# Patient Record
Sex: MALE | Race: WHITE | NOT HISPANIC OR LATINO | URBAN - METROPOLITAN AREA
[De-identification: names, ages, dates, MRNs, and addresses within clinical notes are randomized per-mention and may not be internally consistent; named-entity substitution may affect disease eponyms.]

---

## 2017-04-28 ENCOUNTER — OUTPATIENT (OUTPATIENT)
Dept: OUTPATIENT SERVICES | Facility: HOSPITAL | Age: 57
LOS: 1 days | Discharge: HOME | End: 2017-04-28
Payer: COMMERCIAL

## 2017-04-28 DIAGNOSIS — E78.5 HYPERLIPIDEMIA, UNSPECIFIED: ICD-10-CM

## 2017-04-28 PROCEDURE — 93010 ELECTROCARDIOGRAM REPORT: CPT

## 2023-02-08 ENCOUNTER — EMERGENCY (EMERGENCY)
Facility: HOSPITAL | Age: 63
LOS: 0 days | Discharge: HOME | End: 2023-02-08
Attending: EMERGENCY MEDICINE
Payer: MEDICARE

## 2023-02-08 VITALS
TEMPERATURE: 99 F | DIASTOLIC BLOOD PRESSURE: 89 MMHG | SYSTOLIC BLOOD PRESSURE: 139 MMHG | HEART RATE: 90 BPM | RESPIRATION RATE: 17 BRPM | OXYGEN SATURATION: 98 %

## 2023-02-08 VITALS — WEIGHT: 158.07 LBS | HEIGHT: 71 IN

## 2023-02-08 DIAGNOSIS — R19.09 OTHER INTRA-ABDOMINAL AND PELVIC SWELLING, MASS AND LUMP: ICD-10-CM

## 2023-02-08 DIAGNOSIS — K21.9 GASTRO-ESOPHAGEAL REFLUX DISEASE WITHOUT ESOPHAGITIS: ICD-10-CM

## 2023-02-08 DIAGNOSIS — E78.5 HYPERLIPIDEMIA, UNSPECIFIED: ICD-10-CM

## 2023-02-08 DIAGNOSIS — I10 ESSENTIAL (PRIMARY) HYPERTENSION: ICD-10-CM

## 2023-02-08 DIAGNOSIS — K40.90 UNILATERAL INGUINAL HERNIA, WITHOUT OBSTRUCTION OR GANGRENE, NOT SPECIFIED AS RECURRENT: ICD-10-CM

## 2023-02-08 PROCEDURE — 99283 EMERGENCY DEPT VISIT LOW MDM: CPT

## 2023-02-08 PROCEDURE — 99282 EMERGENCY DEPT VISIT SF MDM: CPT

## 2023-02-08 NOTE — ED STATDOCS - PROGRESS NOTE DETAILS
61 y/o M with PMH of GERD, HLD, HTN presetns with swelling to right inguinal area x 3 days. Swelling worse with standing. Denies fever, chills, nausae, vomitign, difficulty urinating, constipation. +adequate appetite. PE: Well appearing. Cardiac: s1s2, RRR. lungs: CTAB. Abdomen: NBS x4, soft, nontender. +reducible hernia right inguinal area. : performed with Dr. Saha. Normal testicular lie. No testicular tenderness. A/P: inguinal hernia. Advised patient to follow up with surgery for elective repair, return precautions provided. - Michel Nick PA-C

## 2023-02-08 NOTE — ED ADULT TRIAGE NOTE - ARRIVAL FROM
Problem: METABOLIC, FLUID AND ELECTROLYTES - ADULT  Goal: Electrolytes maintained within normal limits  Description  INTERVENTIONS:  - Monitor labs and assess patient for signs and symptoms of electrolyte imbalances  - Administer electrolyte replacement as ordered  - Monitor response to electrolyte replacements, including repeat lab results as appropriate  - Instruct patient on fluid and nutrition as appropriate  Outcome: Progressing  Goal: Fluid balance maintained  Description  INTERVENTIONS:  - Monitor labs   - Monitor I/O and WT  - Instruct patient on fluid and nutrition as appropriate  - Assess for signs & symptoms of volume excess or deficit  Outcome: Progressing  Goal: Glucose maintained within target range  Description  INTERVENTIONS:  - Monitor Blood Glucose as ordered  - Assess for signs and symptoms of hyperglycemia and hypoglycemia  - Administer ordered medications to maintain glucose within target range  - Assess nutritional intake and initiate nutrition service referral as needed  Outcome: Progressing Home

## 2023-02-08 NOTE — ED ADULT NURSE NOTE - NSIMPLEMENTINTERV_GEN_ALL_ED
Implemented All Fall Risk Interventions:  [place] to call system. Call bell, personal items and telephone within reach. Instruct patient to call for assistance. Room bathroom lighting operational. Non-slip footwear when patient is off stretcher. Physically safe environment: no spills, clutter or unnecessary equipment. Stretcher in lowest position, wheels locked, appropriate side rails in place. Provide visual cue, wrist band, yellow gown, etc. Monitor gait and stability. Monitor for mental status changes and reorient to person, place, and time. Review medications for side effects contributing to fall risk. Reinforce activity limits and safety measures with patient and family.

## 2023-02-08 NOTE — ED STATDOCS - NS_ ATTENDINGSCRIBEDETAILS _ED_A_ED_FT
I, Dimitri Saha MD,  performed the initial face to face bedside interview with this patient regarding history of present illness, review of symptoms and relevant past medical, social and family history.  I completed an independent physical examination.  I was the initial provider who evaluated this patient.   I personally saw the patient and performed a substantive portion of the visit including all aspects of the medical decision making.  The history, relevant review of systems, past medical and surgical history, medical decision making, and physical examination was documented by the scribe in my presence and I attest to the accuracy of the documentation.

## 2023-02-08 NOTE — ED STATDOCS - PATIENT PORTAL LINK FT
You can access the FollowMyHealth Patient Portal offered by [facility] by registering at the following website: [email or web address]. By joining [employer]’s FollowMyHealth portal, you will also be able to view your health information using other applications (apps) compatible with our system.

## 2023-02-08 NOTE — ED STATDOCS - CLINICAL SUMMARY MEDICAL DECISION MAKING FREE TEXT BOX
61 y/o male presents with right inguinal bulging after coughing. Exam with obvious hernia that is reducible. No signs of strangulation, incarceration, SBO. Will have pt to follow up with surgery. 61 y/o male presents with right inguinal bulging after coughing. Exam with obvious hernia that is reducible. No signs of strangulation, incarceration, SBO. no testicular pain. Will have pt to follow up with surgery.

## 2023-02-08 NOTE — ED ADULT TRIAGE NOTE - CHIEF COMPLAINT QUOTE
lump on lower abdomen since Saturday. pt denies drainage, states "it looks like it's under the skin". denies erythema or warmth to area but c/o tenderness. pt has not checked his temperature but does not suspect being febrile. no s/sx of systemic infection- denies chills.

## 2023-02-08 NOTE — ED STATDOCS - OBJECTIVE STATEMENT
61 y/o male with a PMHx of GERD, HLD, HTN presents to the ED for evaluation. Pt reports 3 days ago he was showering and felt a lump on the right side of his abd. Pt reports he coughed prior to noticing bulge. Denies testicular pain, fevers. Nonsmoker. No EtOH use. No other complaints at this time.

## 2023-02-08 NOTE — ED STATDOCS - PHYSICAL EXAMINATION
Constitutional: NAD AAOx3  Eyes: PERRLA EOMI  Head: Normocephalic atraumatic  Mouth: MMM  Cardiac: regular rate   Resp: Lungs CTAB  GI: Abd s/nt/nd. Right inguinal hernia reducible. No redness or tenderness. No lymphadenopathy. Normal gross testicular exam.   Neuro: CN2-12 intact  Skin: No visible rashes

## 2023-02-08 NOTE — ED STATDOCS - NS ED ROS FT
Constitutional: No fever or chills  Eyes: No visual changes  HEENT: No throat pain  CV: No chest pain  Resp: No SOB no cough  GI: No abd pain, nausea or vomiting. +lump to abd  : No dysuria  MSK: No musculoskeletal pain  Skin: No rash  Neuro: No headache

## 2023-02-08 NOTE — ED STATDOCS - NS ED ATTENDING STATEMENT MOD
This was a shared visit with the PHILLY. I reviewed and verified the documentation and independently performed the documented:

## 2023-02-08 NOTE — ED STATDOCS - CARE PROVIDER_API CALL
Galileo Phillips  SURGERY  [address] [place] [address]  Phone: [phone]  Fax: [phone]  Follow Up Time:

## 2023-02-08 NOTE — ED STATDOCS - ATTENDING APP SHARED VISIT CONTRIBUTION OF CARE
I, Dimitri Saha MD, personally saw the patient with ACP.  I have personally performed a face to face diagnostic evaluation on this patient.  I have reviewed the ACP note and agree with the history, exam, and plan of care, except as noted.   The initial assessment was performed by myself and then the patient was handed off to the ACP. The patient was followed and re-evaluated by the ACP. All labs, imaging and procedures were evaluated and performed by the ACP and I was available for consultation if any questions in the patients care came up.

## 2023-05-08 PROBLEM — K21.9 GASTRO-ESOPHAGEAL REFLUX DISEASE WITHOUT ESOPHAGITIS: Chronic | Status: ACTIVE | Noted: 2023-02-08

## 2023-05-08 PROBLEM — I10 ESSENTIAL (PRIMARY) HYPERTENSION: Chronic | Status: ACTIVE | Noted: 2023-02-08

## 2023-05-08 PROBLEM — E78.5 HYPERLIPIDEMIA, UNSPECIFIED: Chronic | Status: ACTIVE | Noted: 2023-02-08

## 2023-05-10 ENCOUNTER — OUTPATIENT (OUTPATIENT)
Dept: OUTPATIENT SERVICES | Facility: HOSPITAL | Age: 63
LOS: 1 days | End: 2023-05-10
Payer: MEDICARE

## 2023-05-10 DIAGNOSIS — S82.92XA UNSPECIFIED FRACTURE OF LEFT LOWER LEG, INITIAL ENCOUNTER FOR CLOSED FRACTURE: Chronic | ICD-10-CM

## 2023-05-10 DIAGNOSIS — Z98.890 OTHER SPECIFIED POSTPROCEDURAL STATES: Chronic | ICD-10-CM

## 2023-05-10 DIAGNOSIS — K40.90 UNILATERAL INGUINAL HERNIA, WITHOUT OBSTRUCTION OR GANGRENE, NOT SPECIFIED AS RECURRENT: ICD-10-CM

## 2023-05-10 DIAGNOSIS — Z01.818 ENCOUNTER FOR OTHER PREPROCEDURAL EXAMINATION: ICD-10-CM

## 2023-05-10 LAB
ANION GAP SERPL CALC-SCNC: 4 MMOL/L — LOW (ref 5–17)
BASOPHILS # BLD AUTO: 0.03 K/UL — SIGNIFICANT CHANGE UP (ref 0–0.2)
BASOPHILS NFR BLD AUTO: 0.6 % — SIGNIFICANT CHANGE UP (ref 0–2)
BUN SERPL-MCNC: 22 MG/DL — SIGNIFICANT CHANGE UP (ref 7–23)
CALCIUM SERPL-MCNC: 8.7 MG/DL — SIGNIFICANT CHANGE UP (ref 8.5–10.1)
CHLORIDE SERPL-SCNC: 110 MMOL/L — HIGH (ref 96–108)
CO2 SERPL-SCNC: 28 MMOL/L — SIGNIFICANT CHANGE UP (ref 22–31)
CREAT SERPL-MCNC: 0.86 MG/DL — SIGNIFICANT CHANGE UP (ref 0.5–1.3)
EGFR: 97 ML/MIN/1.73M2 — SIGNIFICANT CHANGE UP
EOSINOPHIL # BLD AUTO: 0.07 K/UL — SIGNIFICANT CHANGE UP (ref 0–0.5)
EOSINOPHIL NFR BLD AUTO: 1.5 % — SIGNIFICANT CHANGE UP (ref 0–6)
GLUCOSE SERPL-MCNC: 82 MG/DL — SIGNIFICANT CHANGE UP (ref 70–99)
HCT VFR BLD CALC: 40.2 % — SIGNIFICANT CHANGE UP (ref 39–50)
HGB BLD-MCNC: 13.7 G/DL — SIGNIFICANT CHANGE UP (ref 13–17)
IMM GRANULOCYTES NFR BLD AUTO: 0.2 % — SIGNIFICANT CHANGE UP (ref 0–0.9)
LYMPHOCYTES # BLD AUTO: 1.97 K/UL — SIGNIFICANT CHANGE UP (ref 1–3.3)
LYMPHOCYTES # BLD AUTO: 41.6 % — SIGNIFICANT CHANGE UP (ref 13–44)
MCHC RBC-ENTMCNC: 31.9 PG — SIGNIFICANT CHANGE UP (ref 27–34)
MCHC RBC-ENTMCNC: 34.1 GM/DL — SIGNIFICANT CHANGE UP (ref 32–36)
MCV RBC AUTO: 93.5 FL — SIGNIFICANT CHANGE UP (ref 80–100)
MONOCYTES # BLD AUTO: 0.42 K/UL — SIGNIFICANT CHANGE UP (ref 0–0.9)
MONOCYTES NFR BLD AUTO: 8.9 % — SIGNIFICANT CHANGE UP (ref 2–14)
NEUTROPHILS # BLD AUTO: 2.24 K/UL — SIGNIFICANT CHANGE UP (ref 1.8–7.4)
NEUTROPHILS NFR BLD AUTO: 47.2 % — SIGNIFICANT CHANGE UP (ref 43–77)
PLATELET # BLD AUTO: 193 K/UL — SIGNIFICANT CHANGE UP (ref 150–400)
POTASSIUM SERPL-MCNC: 3.7 MMOL/L — SIGNIFICANT CHANGE UP (ref 3.5–5.3)
POTASSIUM SERPL-SCNC: 3.7 MMOL/L — SIGNIFICANT CHANGE UP (ref 3.5–5.3)
RBC # BLD: 4.3 M/UL — SIGNIFICANT CHANGE UP (ref 4.2–5.8)
RBC # FLD: 13.1 % — SIGNIFICANT CHANGE UP (ref 10.3–14.5)
SODIUM SERPL-SCNC: 142 MMOL/L — SIGNIFICANT CHANGE UP (ref 135–145)
WBC # BLD: 4.74 K/UL — SIGNIFICANT CHANGE UP (ref 3.8–10.5)
WBC # FLD AUTO: 4.74 K/UL — SIGNIFICANT CHANGE UP (ref 3.8–10.5)

## 2023-05-10 PROCEDURE — 87640 STAPH A DNA AMP PROBE: CPT

## 2023-05-10 PROCEDURE — 86850 RBC ANTIBODY SCREEN: CPT

## 2023-05-10 PROCEDURE — 85025 COMPLETE CBC W/AUTO DIFF WBC: CPT

## 2023-05-10 PROCEDURE — 93010 ELECTROCARDIOGRAM REPORT: CPT

## 2023-05-10 PROCEDURE — 86900 BLOOD TYPING SEROLOGIC ABO: CPT

## 2023-05-10 PROCEDURE — 80048 BASIC METABOLIC PNL TOTAL CA: CPT

## 2023-05-10 PROCEDURE — 36415 COLL VENOUS BLD VENIPUNCTURE: CPT

## 2023-05-10 PROCEDURE — 87641 MR-STAPH DNA AMP PROBE: CPT

## 2023-05-10 PROCEDURE — 93005 ELECTROCARDIOGRAM TRACING: CPT

## 2023-05-10 PROCEDURE — 86901 BLOOD TYPING SEROLOGIC RH(D): CPT

## 2023-05-10 RX ORDER — FUROSEMIDE 40 MG
0 TABLET ORAL
Qty: 0 | Refills: 0 | DISCHARGE

## 2023-05-10 RX ORDER — SIMVASTATIN 20 MG/1
0 TABLET, FILM COATED ORAL
Qty: 0 | Refills: 0 | DISCHARGE

## 2023-05-10 RX ORDER — HYDROCHLOROTHIAZIDE 25 MG
0 TABLET ORAL
Qty: 0 | Refills: 0 | DISCHARGE

## 2023-05-10 NOTE — ASU PATIENT PROFILE, ADULT - NSICDXPASTSURGICALHX_GEN_ALL_CORE_FT
PAST SURGICAL HISTORY:  Fracture of left leg     H/O left wrist surgery     History of meniscectomy of left knee

## 2023-05-10 NOTE — CHART NOTE - NSCHARTNOTEFT_GEN_A_CORE
63 y/o male presents to PST for scheduled right inguinal hernia on 5/16/23.      vs-118/60 hr 66 rr 14 temp 97.6, o2 sat 100%    cbc, bmp, type and screen, mrsa, done today in PST     Pre op, mupirocin and chlorhexidine instructions given and explained.  educational booklet provided   Avoid NSAIDs and OTC supplements.   Patient verbalized understanding  no medication on the DOS

## 2023-05-10 NOTE — ASU PATIENT PROFILE, ADULT - NSICDXPASTMEDICALHX_GEN_ALL_CORE_FT
PAST MEDICAL HISTORY:  Anxiety     GERD (gastroesophageal reflux disease)     HLD (hyperlipidemia)     HTN (hypertension)

## 2023-05-11 DIAGNOSIS — K40.90 UNILATERAL INGUINAL HERNIA, WITHOUT OBSTRUCTION OR GANGRENE, NOT SPECIFIED AS RECURRENT: ICD-10-CM

## 2023-05-11 DIAGNOSIS — Z01.818 ENCOUNTER FOR OTHER PREPROCEDURAL EXAMINATION: ICD-10-CM

## 2023-05-11 LAB
MRSA PCR RESULT.: SIGNIFICANT CHANGE UP
S AUREUS DNA NOSE QL NAA+PROBE: SIGNIFICANT CHANGE UP

## 2023-05-16 ENCOUNTER — TRANSCRIPTION ENCOUNTER (OUTPATIENT)
Age: 63
End: 2023-05-16

## 2023-05-16 ENCOUNTER — OUTPATIENT (OUTPATIENT)
Dept: INPATIENT UNIT | Facility: HOSPITAL | Age: 63
LOS: 1 days | Discharge: HOME | End: 2023-05-16
Payer: MEDICARE

## 2023-05-16 VITALS
DIASTOLIC BLOOD PRESSURE: 65 MMHG | HEART RATE: 73 BPM | TEMPERATURE: 98 F | RESPIRATION RATE: 16 BRPM | SYSTOLIC BLOOD PRESSURE: 112 MMHG | OXYGEN SATURATION: 96 %

## 2023-05-16 VITALS
SYSTOLIC BLOOD PRESSURE: 112 MMHG | OXYGEN SATURATION: 99 % | RESPIRATION RATE: 15 BRPM | WEIGHT: 171.96 LBS | TEMPERATURE: 98 F | DIASTOLIC BLOOD PRESSURE: 58 MMHG | HEART RATE: 58 BPM | HEIGHT: 71 IN

## 2023-05-16 DIAGNOSIS — Z98.890 OTHER SPECIFIED POSTPROCEDURAL STATES: Chronic | ICD-10-CM

## 2023-05-16 DIAGNOSIS — K40.90 UNILATERAL INGUINAL HERNIA, WITHOUT OBSTRUCTION OR GANGRENE, NOT SPECIFIED AS RECURRENT: ICD-10-CM

## 2023-05-16 DIAGNOSIS — S82.92XA UNSPECIFIED FRACTURE OF LEFT LOWER LEG, INITIAL ENCOUNTER FOR CLOSED FRACTURE: Chronic | ICD-10-CM

## 2023-05-16 PROCEDURE — C1781: CPT

## 2023-05-16 PROCEDURE — 49505 PRP I/HERN INIT REDUC >5 YR: CPT | Mod: AS,RT

## 2023-05-16 PROCEDURE — 88302 TISSUE EXAM BY PATHOLOGIST: CPT

## 2023-05-16 PROCEDURE — 88302 TISSUE EXAM BY PATHOLOGIST: CPT | Mod: 26

## 2023-05-16 RX ORDER — OXYCODONE HYDROCHLORIDE 5 MG/1
10 TABLET ORAL ONCE
Refills: 0 | Status: DISCONTINUED | OUTPATIENT
Start: 2023-05-16 | End: 2023-05-16

## 2023-05-16 RX ORDER — OXYCODONE AND ACETAMINOPHEN 5; 325 MG/1; MG/1
1 TABLET ORAL
Qty: 18 | Refills: 0
Start: 2023-05-16 | End: 2023-05-18

## 2023-05-16 RX ORDER — SODIUM CHLORIDE 9 MG/ML
1000 INJECTION, SOLUTION INTRAVENOUS
Refills: 0 | Status: DISCONTINUED | OUTPATIENT
Start: 2023-05-16 | End: 2023-05-16

## 2023-05-16 RX ORDER — FUROSEMIDE 40 MG
1 TABLET ORAL
Refills: 0 | DISCHARGE

## 2023-05-16 RX ORDER — ONDANSETRON 8 MG/1
4 TABLET, FILM COATED ORAL ONCE
Refills: 0 | Status: DISCONTINUED | OUTPATIENT
Start: 2023-05-16 | End: 2023-05-16

## 2023-05-16 RX ORDER — FENTANYL CITRATE 50 UG/ML
50 INJECTION INTRAVENOUS
Refills: 0 | Status: DISCONTINUED | OUTPATIENT
Start: 2023-05-16 | End: 2023-05-16

## 2023-05-16 RX ORDER — OXYCODONE HYDROCHLORIDE 5 MG/1
5 TABLET ORAL ONCE
Refills: 0 | Status: DISCONTINUED | OUTPATIENT
Start: 2023-05-16 | End: 2023-05-16

## 2023-05-16 NOTE — BRIEF OPERATIVE NOTE - OPERATION/FINDINGS
indirect hernia Detail Level: Simple Instructions: This plan will send the code FBSE to the PM system.  DO NOT or CHANGE the price. Price (Do Not Change): 0.00

## 2023-05-16 NOTE — ASU PATIENT PROFILE, ADULT - PAIN SCALE PREFERRED, PROFILE
Problem: Falls - Risk of:  Goal: Will remain free from falls  Description: Will remain free from falls  3/4/2021 1028 by Angel Red RN  Outcome: Ongoing  Note: Patient is a fall risk. See Fall Risk assessment for details. Bed is in low, lock position; call light/belongings within reach. No attempts to get out of bed have been made, calls appropriately when assistance is needed. Bed alarm and hourly rounds in place for safety. Pt would require a slide board to get in and out of bed. Problem: Skin Integrity:  Goal: Will show no infection signs and symptoms  Description: Will show no infection signs and symptoms  3/4/2021 1028 by Angel Red RN  Outcome: Ongoing  Note: Pt at risk for skin breakdown. Skin assessment complete. No signs of skin breakdown. Pts skin cleansed with inc cleanser. Pt repositioned in bed with pillow support.  Pt encouraged to keep AKA elevated on a pillow numerical 0-10

## 2023-05-16 NOTE — ASU PATIENT PROFILE, ADULT - PATIENT REPRESENTATIVE NAME
Attempted to call pt, line busy will try later
Left message for parents to call back regarding results.
PHONE LINE IS BUSY. WILL CALL AGAIN LATER.
Please call and inform parent that we received [facility] results from the throat culture done on Monday. It was negative. Please instruct to continue symptomatic treatment as discussed with [place] in office. If patient is not improving or developing any new/worsening symptoms then RTC, prn or follow up with PCP.  Thanks
brother riki

## 2023-05-16 NOTE — ASU PATIENT PROFILE, ADULT - FALL HARM RISK - UNIVERSAL INTERVENTIONS
Bed in lowest position, wheels locked, appropriate side rails in place/Call bell, personal items and telephone in reach/Instruct patient to call for assistance before getting out of bed or chair/Non-slip footwear when patient is out of bed/[place] to call system/Physically safe environment - no spills, clutter or unnecessary equipment/Purposeful Proactive Rounding/Room/bathroom lighting operational, light cord in reach

## 2023-05-16 NOTE — BRIEF OPERATIVE NOTE - NSICDXBRIEFPROCEDURE_GEN_ALL_CORE_FT
PROCEDURES:  Repair, hernia, inguinal, open, using mesh, adult 16-May-2023 17:44:59  Galileo Phillips

## 2023-05-19 DIAGNOSIS — F41.9 ANXIETY DISORDER, UNSPECIFIED: ICD-10-CM

## 2023-05-19 DIAGNOSIS — K21.9 GASTRO-ESOPHAGEAL REFLUX DISEASE WITHOUT ESOPHAGITIS: ICD-10-CM

## 2023-05-19 DIAGNOSIS — K40.90 UNILATERAL INGUINAL HERNIA, WITHOUT OBSTRUCTION OR GANGRENE, NOT SPECIFIED AS RECURRENT: ICD-10-CM

## 2023-05-19 DIAGNOSIS — Z87.891 PERSONAL HISTORY OF NICOTINE DEPENDENCE: ICD-10-CM

## 2023-05-19 DIAGNOSIS — E78.5 HYPERLIPIDEMIA, UNSPECIFIED: ICD-10-CM

## 2023-05-19 DIAGNOSIS — I10 ESSENTIAL (PRIMARY) HYPERTENSION: ICD-10-CM

## 2023-05-19 LAB — SURGICAL PATHOLOGY STUDY: SIGNIFICANT CHANGE UP

## 2023-10-13 PROBLEM — F41.9 ANXIETY DISORDER, UNSPECIFIED: Chronic | Status: ACTIVE | Noted: 2023-05-10

## 2023-11-22 ENCOUNTER — APPOINTMENT (OUTPATIENT)
Dept: ORTHOPEDIC SURGERY | Facility: CLINIC | Age: 63
End: 2023-11-22
Payer: MEDICARE

## 2023-11-22 VITALS — HEIGHT: 71 IN | WEIGHT: 180 LBS | BODY MASS INDEX: 25.2 KG/M2

## 2023-11-22 DIAGNOSIS — Z87.891 PERSONAL HISTORY OF NICOTINE DEPENDENCE: ICD-10-CM

## 2023-11-22 DIAGNOSIS — G89.29 PAIN IN RIGHT HIP: ICD-10-CM

## 2023-11-22 DIAGNOSIS — E78.00 PURE HYPERCHOLESTEROLEMIA, UNSPECIFIED: ICD-10-CM

## 2023-11-22 DIAGNOSIS — Z87.19 PERSONAL HISTORY OF OTHER DISEASES OF THE DIGESTIVE SYSTEM: ICD-10-CM

## 2023-11-22 DIAGNOSIS — I10 ESSENTIAL (PRIMARY) HYPERTENSION: ICD-10-CM

## 2023-11-22 DIAGNOSIS — Z78.9 OTHER SPECIFIED HEALTH STATUS: ICD-10-CM

## 2023-11-22 DIAGNOSIS — F12.90 CANNABIS USE, UNSPECIFIED, UNCOMPLICATED: ICD-10-CM

## 2023-11-22 DIAGNOSIS — Z86.59 PERSONAL HISTORY OF OTHER MENTAL AND BEHAVIORAL DISORDERS: ICD-10-CM

## 2023-11-22 DIAGNOSIS — M25.551 PAIN IN RIGHT HIP: ICD-10-CM

## 2023-11-22 PROBLEM — Z00.00 ENCOUNTER FOR PREVENTIVE HEALTH EXAMINATION: Status: ACTIVE | Noted: 2023-11-22

## 2023-11-22 PROCEDURE — 73502 X-RAY EXAM HIP UNI 2-3 VIEWS: CPT

## 2023-11-22 PROCEDURE — 99204 OFFICE O/P NEW MOD 45 MIN: CPT

## 2023-11-22 RX ORDER — DIAZEPAM 10 MG/1
10 TABLET ORAL
Refills: 0 | Status: ACTIVE | COMMUNITY

## 2023-11-22 RX ORDER — SIMVASTATIN 20 MG/1
20 TABLET, FILM COATED ORAL
Refills: 0 | Status: ACTIVE | COMMUNITY

## 2023-11-22 RX ORDER — IBUPROFEN 800 MG/1
800 TABLET, FILM COATED ORAL
Refills: 0 | Status: ACTIVE | COMMUNITY

## 2023-11-22 RX ORDER — OMEPRAZOLE 20 MG/1
20 CAPSULE, DELAYED RELEASE ORAL
Refills: 0 | Status: ACTIVE | COMMUNITY

## 2023-11-22 RX ORDER — HYDROCHLOROTHIAZIDE 25 MG/1
25 TABLET ORAL
Refills: 0 | Status: ACTIVE | COMMUNITY

## 2023-12-28 ENCOUNTER — APPOINTMENT (OUTPATIENT)
Dept: ORTHOPEDIC SURGERY | Facility: HOSPITAL | Age: 63
End: 2023-12-28

## 2024-01-03 ENCOUNTER — OUTPATIENT (OUTPATIENT)
Dept: OUTPATIENT SERVICES | Facility: HOSPITAL | Age: 64
LOS: 1 days | End: 2024-01-03
Payer: MEDICARE

## 2024-01-03 ENCOUNTER — RESULT REVIEW (OUTPATIENT)
Age: 64
End: 2024-01-03

## 2024-01-03 VITALS
OXYGEN SATURATION: 100 % | SYSTOLIC BLOOD PRESSURE: 158 MMHG | HEART RATE: 79 BPM | TEMPERATURE: 99 F | HEIGHT: 71 IN | RESPIRATION RATE: 16 BRPM | DIASTOLIC BLOOD PRESSURE: 80 MMHG | WEIGHT: 188.05 LBS

## 2024-01-03 DIAGNOSIS — S82.92XA UNSPECIFIED FRACTURE OF LEFT LOWER LEG, INITIAL ENCOUNTER FOR CLOSED FRACTURE: Chronic | ICD-10-CM

## 2024-01-03 DIAGNOSIS — Z98.890 OTHER SPECIFIED POSTPROCEDURAL STATES: Chronic | ICD-10-CM

## 2024-01-03 DIAGNOSIS — Z01.818 ENCOUNTER FOR OTHER PREPROCEDURAL EXAMINATION: ICD-10-CM

## 2024-01-03 LAB
A1C WITH ESTIMATED AVERAGE GLUCOSE RESULT: 5.5 % — SIGNIFICANT CHANGE UP (ref 4–5.6)
A1C WITH ESTIMATED AVERAGE GLUCOSE RESULT: 5.5 % — SIGNIFICANT CHANGE UP (ref 4–5.6)
ALBUMIN SERPL ELPH-MCNC: 4 G/DL — SIGNIFICANT CHANGE UP (ref 3.3–5)
ALBUMIN SERPL ELPH-MCNC: 4 G/DL — SIGNIFICANT CHANGE UP (ref 3.3–5)
ALP SERPL-CCNC: 97 U/L — SIGNIFICANT CHANGE UP (ref 40–120)
ALP SERPL-CCNC: 97 U/L — SIGNIFICANT CHANGE UP (ref 40–120)
ALT FLD-CCNC: 25 U/L — SIGNIFICANT CHANGE UP (ref 12–78)
ALT FLD-CCNC: 25 U/L — SIGNIFICANT CHANGE UP (ref 12–78)
ANION GAP SERPL CALC-SCNC: 2 MMOL/L — LOW (ref 5–17)
ANION GAP SERPL CALC-SCNC: 2 MMOL/L — LOW (ref 5–17)
APTT BLD: 35.1 SEC — SIGNIFICANT CHANGE UP (ref 24.5–35.6)
APTT BLD: 35.1 SEC — SIGNIFICANT CHANGE UP (ref 24.5–35.6)
AST SERPL-CCNC: 18 U/L — SIGNIFICANT CHANGE UP (ref 15–37)
AST SERPL-CCNC: 18 U/L — SIGNIFICANT CHANGE UP (ref 15–37)
BASOPHILS # BLD AUTO: 0.04 K/UL — SIGNIFICANT CHANGE UP (ref 0–0.2)
BASOPHILS # BLD AUTO: 0.04 K/UL — SIGNIFICANT CHANGE UP (ref 0–0.2)
BASOPHILS NFR BLD AUTO: 0.5 % — SIGNIFICANT CHANGE UP (ref 0–2)
BASOPHILS NFR BLD AUTO: 0.5 % — SIGNIFICANT CHANGE UP (ref 0–2)
BILIRUB SERPL-MCNC: 0.6 MG/DL — SIGNIFICANT CHANGE UP (ref 0.2–1.2)
BILIRUB SERPL-MCNC: 0.6 MG/DL — SIGNIFICANT CHANGE UP (ref 0.2–1.2)
BUN SERPL-MCNC: 13 MG/DL — SIGNIFICANT CHANGE UP (ref 7–23)
BUN SERPL-MCNC: 13 MG/DL — SIGNIFICANT CHANGE UP (ref 7–23)
CALCIUM SERPL-MCNC: 9.4 MG/DL — SIGNIFICANT CHANGE UP (ref 8.5–10.1)
CALCIUM SERPL-MCNC: 9.4 MG/DL — SIGNIFICANT CHANGE UP (ref 8.5–10.1)
CHLORIDE SERPL-SCNC: 104 MMOL/L — SIGNIFICANT CHANGE UP (ref 96–108)
CHLORIDE SERPL-SCNC: 104 MMOL/L — SIGNIFICANT CHANGE UP (ref 96–108)
CO2 SERPL-SCNC: 31 MMOL/L — SIGNIFICANT CHANGE UP (ref 22–31)
CO2 SERPL-SCNC: 31 MMOL/L — SIGNIFICANT CHANGE UP (ref 22–31)
CREAT SERPL-MCNC: 1.04 MG/DL — SIGNIFICANT CHANGE UP (ref 0.5–1.3)
CREAT SERPL-MCNC: 1.04 MG/DL — SIGNIFICANT CHANGE UP (ref 0.5–1.3)
EGFR: 80 ML/MIN/1.73M2 — SIGNIFICANT CHANGE UP
EGFR: 80 ML/MIN/1.73M2 — SIGNIFICANT CHANGE UP
EOSINOPHIL # BLD AUTO: 0.03 K/UL — SIGNIFICANT CHANGE UP (ref 0–0.5)
EOSINOPHIL # BLD AUTO: 0.03 K/UL — SIGNIFICANT CHANGE UP (ref 0–0.5)
EOSINOPHIL NFR BLD AUTO: 0.4 % — SIGNIFICANT CHANGE UP (ref 0–6)
EOSINOPHIL NFR BLD AUTO: 0.4 % — SIGNIFICANT CHANGE UP (ref 0–6)
ESTIMATED AVERAGE GLUCOSE: 111 MG/DL — SIGNIFICANT CHANGE UP (ref 68–114)
ESTIMATED AVERAGE GLUCOSE: 111 MG/DL — SIGNIFICANT CHANGE UP (ref 68–114)
GLUCOSE SERPL-MCNC: 89 MG/DL — SIGNIFICANT CHANGE UP (ref 70–99)
GLUCOSE SERPL-MCNC: 89 MG/DL — SIGNIFICANT CHANGE UP (ref 70–99)
HCT VFR BLD CALC: 46.8 % — SIGNIFICANT CHANGE UP (ref 39–50)
HCT VFR BLD CALC: 46.8 % — SIGNIFICANT CHANGE UP (ref 39–50)
HGB BLD-MCNC: 16 G/DL — SIGNIFICANT CHANGE UP (ref 13–17)
HGB BLD-MCNC: 16 G/DL — SIGNIFICANT CHANGE UP (ref 13–17)
IMM GRANULOCYTES NFR BLD AUTO: 0.4 % — SIGNIFICANT CHANGE UP (ref 0–0.9)
IMM GRANULOCYTES NFR BLD AUTO: 0.4 % — SIGNIFICANT CHANGE UP (ref 0–0.9)
INR BLD: 0.96 RATIO — SIGNIFICANT CHANGE UP (ref 0.85–1.18)
INR BLD: 0.96 RATIO — SIGNIFICANT CHANGE UP (ref 0.85–1.18)
LYMPHOCYTES # BLD AUTO: 2.22 K/UL — SIGNIFICANT CHANGE UP (ref 1–3.3)
LYMPHOCYTES # BLD AUTO: 2.22 K/UL — SIGNIFICANT CHANGE UP (ref 1–3.3)
LYMPHOCYTES # BLD AUTO: 29.9 % — SIGNIFICANT CHANGE UP (ref 13–44)
LYMPHOCYTES # BLD AUTO: 29.9 % — SIGNIFICANT CHANGE UP (ref 13–44)
MCHC RBC-ENTMCNC: 31.6 PG — SIGNIFICANT CHANGE UP (ref 27–34)
MCHC RBC-ENTMCNC: 31.6 PG — SIGNIFICANT CHANGE UP (ref 27–34)
MCHC RBC-ENTMCNC: 34.2 GM/DL — SIGNIFICANT CHANGE UP (ref 32–36)
MCHC RBC-ENTMCNC: 34.2 GM/DL — SIGNIFICANT CHANGE UP (ref 32–36)
MCV RBC AUTO: 92.3 FL — SIGNIFICANT CHANGE UP (ref 80–100)
MCV RBC AUTO: 92.3 FL — SIGNIFICANT CHANGE UP (ref 80–100)
MONOCYTES # BLD AUTO: 0.58 K/UL — SIGNIFICANT CHANGE UP (ref 0–0.9)
MONOCYTES # BLD AUTO: 0.58 K/UL — SIGNIFICANT CHANGE UP (ref 0–0.9)
MONOCYTES NFR BLD AUTO: 7.8 % — SIGNIFICANT CHANGE UP (ref 2–14)
MONOCYTES NFR BLD AUTO: 7.8 % — SIGNIFICANT CHANGE UP (ref 2–14)
NEUTROPHILS # BLD AUTO: 4.52 K/UL — SIGNIFICANT CHANGE UP (ref 1.8–7.4)
NEUTROPHILS # BLD AUTO: 4.52 K/UL — SIGNIFICANT CHANGE UP (ref 1.8–7.4)
NEUTROPHILS NFR BLD AUTO: 61 % — SIGNIFICANT CHANGE UP (ref 43–77)
NEUTROPHILS NFR BLD AUTO: 61 % — SIGNIFICANT CHANGE UP (ref 43–77)
PLATELET # BLD AUTO: 225 K/UL — SIGNIFICANT CHANGE UP (ref 150–400)
PLATELET # BLD AUTO: 225 K/UL — SIGNIFICANT CHANGE UP (ref 150–400)
POTASSIUM SERPL-MCNC: 3.7 MMOL/L — SIGNIFICANT CHANGE UP (ref 3.5–5.3)
POTASSIUM SERPL-MCNC: 3.7 MMOL/L — SIGNIFICANT CHANGE UP (ref 3.5–5.3)
POTASSIUM SERPL-SCNC: 3.7 MMOL/L — SIGNIFICANT CHANGE UP (ref 3.5–5.3)
POTASSIUM SERPL-SCNC: 3.7 MMOL/L — SIGNIFICANT CHANGE UP (ref 3.5–5.3)
PROT SERPL-MCNC: 8.2 GM/DL — SIGNIFICANT CHANGE UP (ref 6–8.3)
PROT SERPL-MCNC: 8.2 GM/DL — SIGNIFICANT CHANGE UP (ref 6–8.3)
PROTHROM AB SERPL-ACNC: 10.9 SEC — SIGNIFICANT CHANGE UP (ref 9.5–13)
PROTHROM AB SERPL-ACNC: 10.9 SEC — SIGNIFICANT CHANGE UP (ref 9.5–13)
RBC # BLD: 5.07 M/UL — SIGNIFICANT CHANGE UP (ref 4.2–5.8)
RBC # BLD: 5.07 M/UL — SIGNIFICANT CHANGE UP (ref 4.2–5.8)
RBC # FLD: 12.6 % — SIGNIFICANT CHANGE UP (ref 10.3–14.5)
RBC # FLD: 12.6 % — SIGNIFICANT CHANGE UP (ref 10.3–14.5)
SODIUM SERPL-SCNC: 137 MMOL/L — SIGNIFICANT CHANGE UP (ref 135–145)
SODIUM SERPL-SCNC: 137 MMOL/L — SIGNIFICANT CHANGE UP (ref 135–145)
WBC # BLD: 7.42 K/UL — SIGNIFICANT CHANGE UP (ref 3.8–10.5)
WBC # BLD: 7.42 K/UL — SIGNIFICANT CHANGE UP (ref 3.8–10.5)
WBC # FLD AUTO: 7.42 K/UL — SIGNIFICANT CHANGE UP (ref 3.8–10.5)
WBC # FLD AUTO: 7.42 K/UL — SIGNIFICANT CHANGE UP (ref 3.8–10.5)

## 2024-01-03 PROCEDURE — 93010 ELECTROCARDIOGRAM REPORT: CPT

## 2024-01-03 PROCEDURE — 85610 PROTHROMBIN TIME: CPT

## 2024-01-03 PROCEDURE — 36415 COLL VENOUS BLD VENIPUNCTURE: CPT

## 2024-01-03 PROCEDURE — 99214 OFFICE O/P EST MOD 30 MIN: CPT | Mod: 25

## 2024-01-03 PROCEDURE — 71046 X-RAY EXAM CHEST 2 VIEWS: CPT | Mod: 26

## 2024-01-03 PROCEDURE — 85730 THROMBOPLASTIN TIME PARTIAL: CPT

## 2024-01-03 PROCEDURE — 80053 COMPREHEN METABOLIC PANEL: CPT

## 2024-01-03 PROCEDURE — 85025 COMPLETE CBC W/AUTO DIFF WBC: CPT

## 2024-01-03 PROCEDURE — 93005 ELECTROCARDIOGRAM TRACING: CPT

## 2024-01-03 PROCEDURE — 83036 HEMOGLOBIN GLYCOSYLATED A1C: CPT

## 2024-01-03 PROCEDURE — 87640 STAPH A DNA AMP PROBE: CPT

## 2024-01-03 PROCEDURE — 87641 MR-STAPH DNA AMP PROBE: CPT

## 2024-01-03 PROCEDURE — 71046 X-RAY EXAM CHEST 2 VIEWS: CPT

## 2024-01-03 RX ORDER — CALCIUM CARBONATE 500(1250)
0 TABLET ORAL
Refills: 0 | DISCHARGE

## 2024-01-03 NOTE — H&P PST ADULT - NSICDXPASTMEDICALHX_GEN_ALL_CORE_FT
PAST MEDICAL HISTORY:  Anxiety     GERD (gastroesophageal reflux disease)     HLD (hyperlipidemia)     HTN (hypertension)     Lumbar herniated disc     OA (osteoarthritis)

## 2024-01-03 NOTE — H&P PST ADULT - NSICDXPASTSURGICALHX_GEN_ALL_CORE_FT
PAST SURGICAL HISTORY:  Fracture of left leg     H/O left wrist surgery     H/O right inguinal hernia repair     History of meniscectomy of left knee

## 2024-01-03 NOTE — H&P PST ADULT - NSICDXFAMILYHX_GEN_ALL_CORE_FT
FAMILY HISTORY:  Mother  Still living? Unknown  Family history of vascular disease, Age at diagnosis: Age Unknown

## 2024-01-03 NOTE — H&P PST ADULT - HISTORY OF PRESENT ILLNESS
63 year old male with OA right hip; unable to carry shopping bag or  bag of cat food due to pain; Uses medical marijuana for pain; walks with cane; he presents to PST for planned right THR

## 2024-01-03 NOTE — H&P PST ADULT - ASSESSMENT
Plan:  1. PST instructions given ; NPO status/  instructions to be given by ASU   2. Pt instructed to take following meds on day of surgery:   3. Pt instructed to take routine evening medications unless indicated   4. Stop NSAIDS ( Aspirin Alev Motrin Mobic Diclofenac), herbal supplements , MVI , Vitamin fish oil 7 days prior to surgery  unless   directed by surgeon or cardiologist;   5. Medical Optimization  with    6. EZ wash instructions given & mupirocin instructions given  7. Labs EKG CXR as per surgeon request   8. Pt denies covid symptoms shortness of breath fever cough   9.  Joint Education Booklet given   10. PATIENT WILL REQUIRE A ROLLING WALKER AT HOME DUE TO THEIR DIAGNOSIS OF OSTEOARTHRITIS OF HIP OR KNEE TO HELP COMPLETE MRADL'S.   63 year old male with OA right hip; unable to carry shopping bag or  bag of cat food due to pain; Uses medical marijuana for pain; walks with cane; he presents to PST for planned right THR         Plan:  1. PST instructions given ; NPO status/  instructions to be given by ASU   2. Pt instructed to take following meds on day of surgery: valium prn ( Instructed patient to notify anesthesia if taking on day of surgery )  3. Pt instructed to take routine evening medications unless indicated   4. Stop NSAIDS ( Aspirin Alev Motrin Mobic Diclofenac), herbal supplements , MVI , Vitamin fish oil 7 days prior to surgery  unless   directed by surgeon or cardiologist;   5. Medical Optimization  with Dr Mcfadden and Dr Puri cardio  6. EZ wash instructions given & mupirocin instructions given  7. Labs EKG CXR as per surgeon request   8. Pt denies covid symptoms shortness of breath fever cough   9.  Joint Education Booklet given   10. PATIENT WILL REQUIRE A ROLLING WALKER AT HOME DUE TO THEIR DIAGNOSIS OF OSTEOARTHRITIS OF HIP OR KNEE TO HELP COMPLETE MRADL'S.    CAPRINI SCORE [CLOT]    AGE RELATED RISK FACTORS                                                       MOBILITY RELATED FACTORS  [ ] Age 41-60 years                                            (1 Point)                  [ ] Bed rest                                                        (1 Point)  [x Age: 61-74 years                                           (2 Points)                 [ ] Plaster cast                                                   (2 Points)  [ ] Age= 75 years                                              (3 Points)                 [ ] Bed bound for more than 72 hours                 (2 Points)    DISEASE RELATED RISK FACTORS                                               GENDER SPECIFIC FACTORS  [ ] Edema in the lower extremities                       (1 Point)                  [ ] Pregnancy                                                     (1 Point)  [ ] Varicose veins                                               (1 Point)                  [ ] Post-partum < 6 weeks                                   (1 Point)             [ ] BMI > 25 Kg/m2                                            (1 Point)                  [ ] Hormonal therapy  or oral contraception          (1 Point)                 [ ] Sepsis (in the previous month)                        (1 Point)                  [ ] History of pregnancy complications                 (1 point)  [ ] Pneumonia or serious lung disease                                               [ ] Unexplained or recurrent                     (1 Point)           (in the previous month)                               (1 Point)  [ ] Abnormal pulmonary function test                     (1 Point)                 SURGERY RELATED RISK FACTORS  [ ] Acute myocardial infarction                              (1 Point)                 [ ]  Section                                             (1 Point)  [ ] Congestive heart failure (in the previous month)  (1 Point)               [ ] Minor surgery                                                  (1 Point)   [ ] Inflammatory bowel disease                             (1 Point)                 [ ] Arthroscopic surgery                                        (2 Points)  [ ] Central venous access                                      (2 Points)                [ ] General surgery lasting more than 45 minutes   (2 Points)       [ ] Stroke (in the previous month)                          (5 Points)               [x ] Elective arthroplasty                                         (5 Points)            ( ) presents and past malignancy                           (2 points)                                                                                                         HEMATOLOGY RELATED FACTORS                                                 TRAUMA RELATED RISK FACTORS  [ ] Prior episodes of VTE                                     (3 Points)                 [ ] Fracture of the hip, pelvis, or leg                       (5 Points)  [ ] Positive family history for VTE                         (3 Points)                 [ ] Acute spinal cord injury (in the previous month)  (5 Points)  [ ] Prothrombin 28451 A                                     (3 Points)                 [ ] Paralysis  (less than 1 month)                             (5 Points)  [ ] Factor V Leiden                                             (3 Points)                  [ ] Multiple Trauma within 1 month                        (5 Points)  [ ] Lupus anticoagulants                                     (3 Points)                                                           [ ] Anticardiolipin antibodies                               (3 Points)                                                       [ ] High homocysteine in the blood                      (3 Points)                                             [ ] Other congenital or acquired thrombophilia      (3 Points)                                                [ ] Heparin induced thrombocytopenia                  (3 Points)                                          Total Score [   7       ]    The Caprini score indicates that this patient is at high risk for a VTE event (score 6 or greater). Surgical patients in this group will benefit from both pharmacologic prophylaxis and intermittent compression devices.  The surgical team will determine the balance between VTE risk and bleeding risk, and other clinical considerations    63 year old male with OA right hip; unable to carry shopping bag or  bag of cat food due to pain; Uses medical marijuana for pain; walks with cane; he presents to PST for planned right THR         Plan:  1. PST instructions given ; NPO status/  instructions to be given by ASU   2. Pt instructed to take following meds on day of surgery: valium prn ( Instructed patient to notify anesthesia if taking on day of surgery )  3. Pt instructed to take routine evening medications unless indicated   4. Stop NSAIDS ( Aspirin Alev Motrin Mobic Diclofenac), herbal supplements , MVI , Vitamin fish oil 7 days prior to surgery  unless   directed by surgeon or cardiologist;   5. Medical Optimization  with Dr Mcfadden and Dr Puri cardio  6. EZ wash instructions given & mupirocin instructions given  7. Labs EKG CXR as per surgeon request   8. Pt denies covid symptoms shortness of breath fever cough   9.  Joint Education Booklet given   10. PATIENT WILL REQUIRE A ROLLING WALKER AT HOME DUE TO THEIR DIAGNOSIS OF OSTEOARTHRITIS OF HIP OR KNEE TO HELP COMPLETE MRADL'S.    CAPRINI SCORE [CLOT]    AGE RELATED RISK FACTORS                                                       MOBILITY RELATED FACTORS  [ ] Age 41-60 years                                            (1 Point)                  [ ] Bed rest                                                        (1 Point)  [x Age: 61-74 years                                           (2 Points)                 [ ] Plaster cast                                                   (2 Points)  [ ] Age= 75 years                                              (3 Points)                 [ ] Bed bound for more than 72 hours                 (2 Points)    DISEASE RELATED RISK FACTORS                                               GENDER SPECIFIC FACTORS  [ ] Edema in the lower extremities                       (1 Point)                  [ ] Pregnancy                                                     (1 Point)  [ ] Varicose veins                                               (1 Point)                  [ ] Post-partum < 6 weeks                                   (1 Point)             [ ] BMI > 25 Kg/m2                                            (1 Point)                  [ ] Hormonal therapy  or oral contraception          (1 Point)                 [ ] Sepsis (in the previous month)                        (1 Point)                  [ ] History of pregnancy complications                 (1 point)  [ ] Pneumonia or serious lung disease                                               [ ] Unexplained or recurrent                     (1 Point)           (in the previous month)                               (1 Point)  [ ] Abnormal pulmonary function test                     (1 Point)                 SURGERY RELATED RISK FACTORS  [ ] Acute myocardial infarction                              (1 Point)                 [ ]  Section                                             (1 Point)  [ ] Congestive heart failure (in the previous month)  (1 Point)               [ ] Minor surgery                                                  (1 Point)   [ ] Inflammatory bowel disease                             (1 Point)                 [ ] Arthroscopic surgery                                        (2 Points)  [ ] Central venous access                                      (2 Points)                [ ] General surgery lasting more than 45 minutes   (2 Points)       [ ] Stroke (in the previous month)                          (5 Points)               [x ] Elective arthroplasty                                         (5 Points)            ( ) presents and past malignancy                           (2 points)                                                                                                         HEMATOLOGY RELATED FACTORS                                                 TRAUMA RELATED RISK FACTORS  [ ] Prior episodes of VTE                                     (3 Points)                 [ ] Fracture of the hip, pelvis, or leg                       (5 Points)  [ ] Positive family history for VTE                         (3 Points)                 [ ] Acute spinal cord injury (in the previous month)  (5 Points)  [ ] Prothrombin 16046 A                                     (3 Points)                 [ ] Paralysis  (less than 1 month)                             (5 Points)  [ ] Factor V Leiden                                             (3 Points)                  [ ] Multiple Trauma within 1 month                        (5 Points)  [ ] Lupus anticoagulants                                     (3 Points)                                                           [ ] Anticardiolipin antibodies                               (3 Points)                                                       [ ] High homocysteine in the blood                      (3 Points)                                             [ ] Other congenital or acquired thrombophilia      (3 Points)                                                [ ] Heparin induced thrombocytopenia                  (3 Points)                                          Total Score [   7       ]    The Caprini score indicates that this patient is at high risk for a VTE event (score 6 or greater). Surgical patients in this group will benefit from both pharmacologic prophylaxis and intermittent compression devices.  The surgical team will determine the balance between VTE risk and bleeding risk, and other clinical considerations

## 2024-01-04 DIAGNOSIS — Z01.818 ENCOUNTER FOR OTHER PREPROCEDURAL EXAMINATION: ICD-10-CM

## 2024-01-04 LAB
MRSA PCR RESULT.: SIGNIFICANT CHANGE UP
MRSA PCR RESULT.: SIGNIFICANT CHANGE UP
S AUREUS DNA NOSE QL NAA+PROBE: SIGNIFICANT CHANGE UP
S AUREUS DNA NOSE QL NAA+PROBE: SIGNIFICANT CHANGE UP

## 2024-01-12 RX ORDER — ONDANSETRON 8 MG/1
4 TABLET, FILM COATED ORAL EVERY 6 HOURS
Refills: 0 | Status: DISCONTINUED | OUTPATIENT
Start: 2024-01-16 | End: 2024-01-17

## 2024-01-12 RX ORDER — TRANEXAMIC ACID 100 MG/ML
1000 INJECTION, SOLUTION INTRAVENOUS ONCE
Refills: 0 | Status: DISCONTINUED | OUTPATIENT
Start: 2024-01-16 | End: 2024-01-17

## 2024-01-12 RX ORDER — ASPIRIN/CALCIUM CARB/MAGNESIUM 324 MG
81 TABLET ORAL
Refills: 0 | Status: DISCONTINUED | OUTPATIENT
Start: 2024-01-16 | End: 2024-01-17

## 2024-01-12 RX ORDER — FOLIC ACID 0.8 MG
1 TABLET ORAL DAILY
Refills: 0 | Status: DISCONTINUED | OUTPATIENT
Start: 2024-01-16 | End: 2024-01-17

## 2024-01-12 RX ORDER — FERROUS SULFATE 325(65) MG
325 TABLET ORAL DAILY
Refills: 0 | Status: DISCONTINUED | OUTPATIENT
Start: 2024-01-16 | End: 2024-01-17

## 2024-01-12 RX ORDER — POLYETHYLENE GLYCOL 3350 17 G/17G
17 POWDER, FOR SOLUTION ORAL AT BEDTIME
Refills: 0 | Status: DISCONTINUED | OUTPATIENT
Start: 2024-01-16 | End: 2024-01-17

## 2024-01-12 RX ORDER — ACETAMINOPHEN 500 MG
1000 TABLET ORAL EVERY 8 HOURS
Refills: 0 | Status: DISCONTINUED | OUTPATIENT
Start: 2024-01-16 | End: 2024-01-17

## 2024-01-12 RX ORDER — SENNA PLUS 8.6 MG/1
2 TABLET ORAL AT BEDTIME
Refills: 0 | Status: DISCONTINUED | OUTPATIENT
Start: 2024-01-16 | End: 2024-01-17

## 2024-01-12 RX ORDER — HYDROMORPHONE HYDROCHLORIDE 2 MG/ML
0.5 INJECTION INTRAMUSCULAR; INTRAVENOUS; SUBCUTANEOUS EVERY 4 HOURS
Refills: 0 | Status: DISCONTINUED | OUTPATIENT
Start: 2024-01-16 | End: 2024-01-17

## 2024-01-12 RX ORDER — MAGNESIUM HYDROXIDE 400 MG/1
30 TABLET, CHEWABLE ORAL DAILY
Refills: 0 | Status: DISCONTINUED | OUTPATIENT
Start: 2024-01-16 | End: 2024-01-17

## 2024-01-12 RX ORDER — OXYCODONE HYDROCHLORIDE 5 MG/1
10 TABLET ORAL
Refills: 0 | Status: DISCONTINUED | OUTPATIENT
Start: 2024-01-16 | End: 2024-01-17

## 2024-01-12 RX ORDER — PANTOPRAZOLE SODIUM 20 MG/1
40 TABLET, DELAYED RELEASE ORAL
Refills: 0 | Status: DISCONTINUED | OUTPATIENT
Start: 2024-01-16 | End: 2024-01-17

## 2024-01-12 RX ORDER — SODIUM CHLORIDE 9 MG/ML
1000 INJECTION, SOLUTION INTRAVENOUS
Refills: 0 | Status: DISCONTINUED | OUTPATIENT
Start: 2024-01-17 | End: 2024-01-17

## 2024-01-12 RX ORDER — CELECOXIB 200 MG/1
200 CAPSULE ORAL EVERY 12 HOURS
Refills: 0 | Status: DISCONTINUED | OUTPATIENT
Start: 2024-01-17 | End: 2024-01-17

## 2024-01-12 RX ORDER — DEXAMETHASONE 0.5 MG/5ML
8 ELIXIR ORAL ONCE
Refills: 0 | Status: COMPLETED | OUTPATIENT
Start: 2024-01-17 | End: 2024-01-17

## 2024-01-12 RX ORDER — OXYCODONE HYDROCHLORIDE 5 MG/1
5 TABLET ORAL
Refills: 0 | Status: DISCONTINUED | OUTPATIENT
Start: 2024-01-16 | End: 2024-01-17

## 2024-01-16 ENCOUNTER — TRANSCRIPTION ENCOUNTER (OUTPATIENT)
Age: 64
End: 2024-01-16

## 2024-01-16 ENCOUNTER — APPOINTMENT (OUTPATIENT)
Dept: ORTHOPEDIC SURGERY | Facility: HOSPITAL | Age: 64
End: 2024-01-16

## 2024-01-16 ENCOUNTER — INPATIENT (INPATIENT)
Facility: HOSPITAL | Age: 64
LOS: 0 days | Discharge: HOME HEALTH | DRG: 470 | End: 2024-01-17
Attending: ORTHOPAEDIC SURGERY | Admitting: ORTHOPAEDIC SURGERY
Payer: MEDICARE

## 2024-01-16 ENCOUNTER — RESULT REVIEW (OUTPATIENT)
Age: 64
End: 2024-01-16

## 2024-01-16 VITALS
RESPIRATION RATE: 16 BRPM | OXYGEN SATURATION: 99 % | HEIGHT: 71 IN | TEMPERATURE: 98 F | DIASTOLIC BLOOD PRESSURE: 81 MMHG | SYSTOLIC BLOOD PRESSURE: 136 MMHG | HEART RATE: 79 BPM | WEIGHT: 188.05 LBS

## 2024-01-16 DIAGNOSIS — Z98.890 OTHER SPECIFIED POSTPROCEDURAL STATES: Chronic | ICD-10-CM

## 2024-01-16 DIAGNOSIS — S82.92XA UNSPECIFIED FRACTURE OF LEFT LOWER LEG, INITIAL ENCOUNTER FOR CLOSED FRACTURE: Chronic | ICD-10-CM

## 2024-01-16 DIAGNOSIS — M16.11 UNILATERAL PRIMARY OSTEOARTHRITIS, RIGHT HIP: ICD-10-CM

## 2024-01-16 LAB
ANION GAP SERPL CALC-SCNC: 5 MMOL/L — SIGNIFICANT CHANGE UP (ref 5–17)
BLD GP AB SCN SERPL QL: SIGNIFICANT CHANGE UP
BUN SERPL-MCNC: 13 MG/DL — SIGNIFICANT CHANGE UP (ref 7–23)
CALCIUM SERPL-MCNC: 8.5 MG/DL — SIGNIFICANT CHANGE UP (ref 8.5–10.1)
CHLORIDE SERPL-SCNC: 109 MMOL/L — HIGH (ref 96–108)
CO2 SERPL-SCNC: 25 MMOL/L — SIGNIFICANT CHANGE UP (ref 22–31)
CREAT SERPL-MCNC: 0.94 MG/DL — SIGNIFICANT CHANGE UP (ref 0.5–1.3)
EGFR: 90 ML/MIN/1.73M2 — SIGNIFICANT CHANGE UP
GLUCOSE BLDC GLUCOMTR-MCNC: 88 MG/DL — SIGNIFICANT CHANGE UP (ref 70–99)
GLUCOSE BLDC GLUCOMTR-MCNC: 99 MG/DL — SIGNIFICANT CHANGE UP (ref 70–99)
GLUCOSE SERPL-MCNC: 89 MG/DL — SIGNIFICANT CHANGE UP (ref 70–99)
HCT VFR BLD CALC: 40.6 % — SIGNIFICANT CHANGE UP (ref 39–50)
HGB BLD-MCNC: 13.9 G/DL — SIGNIFICANT CHANGE UP (ref 13–17)
MCHC RBC-ENTMCNC: 31.5 PG — SIGNIFICANT CHANGE UP (ref 27–34)
MCHC RBC-ENTMCNC: 34.2 GM/DL — SIGNIFICANT CHANGE UP (ref 32–36)
MCV RBC AUTO: 92.1 FL — SIGNIFICANT CHANGE UP (ref 80–100)
PLATELET # BLD AUTO: 197 K/UL — SIGNIFICANT CHANGE UP (ref 150–400)
POTASSIUM SERPL-MCNC: 3.9 MMOL/L — SIGNIFICANT CHANGE UP (ref 3.5–5.3)
POTASSIUM SERPL-SCNC: 3.9 MMOL/L — SIGNIFICANT CHANGE UP (ref 3.5–5.3)
RBC # BLD: 4.41 M/UL — SIGNIFICANT CHANGE UP (ref 4.2–5.8)
RBC # FLD: 12 % — SIGNIFICANT CHANGE UP (ref 10.3–14.5)
SODIUM SERPL-SCNC: 139 MMOL/L — SIGNIFICANT CHANGE UP (ref 135–145)
WBC # BLD: 6.64 K/UL — SIGNIFICANT CHANGE UP (ref 3.8–10.5)
WBC # FLD AUTO: 6.64 K/UL — SIGNIFICANT CHANGE UP (ref 3.8–10.5)

## 2024-01-16 PROCEDURE — 97166 OT EVAL MOD COMPLEX 45 MIN: CPT | Mod: GO

## 2024-01-16 PROCEDURE — 97535 SELF CARE MNGMENT TRAINING: CPT | Mod: GO

## 2024-01-16 PROCEDURE — 86900 BLOOD TYPING SEROLOGIC ABO: CPT

## 2024-01-16 PROCEDURE — 99222 1ST HOSP IP/OBS MODERATE 55: CPT

## 2024-01-16 PROCEDURE — 97116 GAIT TRAINING THERAPY: CPT | Mod: GP

## 2024-01-16 PROCEDURE — 97530 THERAPEUTIC ACTIVITIES: CPT | Mod: GP

## 2024-01-16 PROCEDURE — 36415 COLL VENOUS BLD VENIPUNCTURE: CPT

## 2024-01-16 PROCEDURE — 73501 X-RAY EXAM HIP UNI 1 VIEW: CPT | Mod: 26,RT

## 2024-01-16 PROCEDURE — 73501 X-RAY EXAM HIP UNI 1 VIEW: CPT | Mod: RT

## 2024-01-16 PROCEDURE — 85027 COMPLETE CBC AUTOMATED: CPT

## 2024-01-16 PROCEDURE — 88305 TISSUE EXAM BY PATHOLOGIST: CPT | Mod: 26

## 2024-01-16 PROCEDURE — C1713: CPT

## 2024-01-16 PROCEDURE — 80048 BASIC METABOLIC PNL TOTAL CA: CPT

## 2024-01-16 PROCEDURE — 86850 RBC ANTIBODY SCREEN: CPT

## 2024-01-16 PROCEDURE — 86901 BLOOD TYPING SEROLOGIC RH(D): CPT

## 2024-01-16 PROCEDURE — 82962 GLUCOSE BLOOD TEST: CPT

## 2024-01-16 PROCEDURE — 27130 TOTAL HIP ARTHROPLASTY: CPT | Mod: RT

## 2024-01-16 PROCEDURE — C1776: CPT

## 2024-01-16 PROCEDURE — 88305 TISSUE EXAM BY PATHOLOGIST: CPT

## 2024-01-16 PROCEDURE — 27130 TOTAL HIP ARTHROPLASTY: CPT | Mod: AS,RT

## 2024-01-16 RX ORDER — OXYCODONE HYDROCHLORIDE 5 MG/1
1 TABLET ORAL
Qty: 30 | Refills: 0
Start: 2024-01-16 | End: 2024-01-20

## 2024-01-16 RX ORDER — SIMVASTATIN 20 MG/1
1 TABLET, FILM COATED ORAL
Refills: 0 | DISCHARGE

## 2024-01-16 RX ORDER — ACETAMINOPHEN 500 MG
1000 TABLET ORAL ONCE
Refills: 0 | Status: COMPLETED | OUTPATIENT
Start: 2024-01-16 | End: 2024-01-16

## 2024-01-16 RX ORDER — HYDROCHLOROTHIAZIDE 25 MG
1 TABLET ORAL
Refills: 0 | DISCHARGE

## 2024-01-16 RX ORDER — NALOXONE HYDROCHLORIDE 4 MG/.1ML
0.4 SPRAY NASAL ONCE
Refills: 0 | Status: DISCONTINUED | OUTPATIENT
Start: 2024-01-16 | End: 2024-01-17

## 2024-01-16 RX ORDER — CEFAZOLIN SODIUM 1 G
2000 VIAL (EA) INJECTION EVERY 8 HOURS
Refills: 0 | Status: DISCONTINUED | OUTPATIENT
Start: 2024-01-16 | End: 2024-01-16

## 2024-01-16 RX ORDER — CEFAZOLIN SODIUM 1 G
2000 VIAL (EA) INJECTION EVERY 8 HOURS
Refills: 0 | Status: COMPLETED | OUTPATIENT
Start: 2024-01-16 | End: 2024-01-17

## 2024-01-16 RX ORDER — SENNA PLUS 8.6 MG/1
2 TABLET ORAL
Qty: 28 | Refills: 0
Start: 2024-01-16 | End: 2024-01-29

## 2024-01-16 RX ORDER — POLYETHYLENE GLYCOL 3350 17 G/17G
17 POWDER, FOR SOLUTION ORAL
Qty: 1 | Refills: 0
Start: 2024-01-16 | End: 2024-01-29

## 2024-01-16 RX ORDER — OMEPRAZOLE 10 MG/1
1 CAPSULE, DELAYED RELEASE ORAL
Qty: 0 | Refills: 0 | DISCHARGE

## 2024-01-16 RX ORDER — DIAZEPAM 5 MG
1 TABLET ORAL
Refills: 0 | DISCHARGE

## 2024-01-16 RX ORDER — DIAZEPAM 5 MG
10 TABLET ORAL ONCE
Refills: 0 | Status: DISCONTINUED | OUTPATIENT
Start: 2024-01-16 | End: 2024-01-16

## 2024-01-16 RX ORDER — PANTOPRAZOLE SODIUM 20 MG/1
40 TABLET, DELAYED RELEASE ORAL ONCE
Refills: 0 | Status: COMPLETED | OUTPATIENT
Start: 2024-01-16 | End: 2024-01-16

## 2024-01-16 RX ORDER — FENTANYL CITRATE 50 UG/ML
50 INJECTION INTRAVENOUS
Refills: 0 | Status: DISCONTINUED | OUTPATIENT
Start: 2024-01-16 | End: 2024-01-16

## 2024-01-16 RX ORDER — PANTOPRAZOLE SODIUM 20 MG/1
1 TABLET, DELAYED RELEASE ORAL
Qty: 30 | Refills: 0
Start: 2024-01-16 | End: 2024-02-14

## 2024-01-16 RX ORDER — ONDANSETRON 8 MG/1
4 TABLET, FILM COATED ORAL ONCE
Refills: 0 | Status: DISCONTINUED | OUTPATIENT
Start: 2024-01-16 | End: 2024-01-16

## 2024-01-16 RX ORDER — OXYCODONE HYDROCHLORIDE 5 MG/1
5 TABLET ORAL ONCE
Refills: 0 | Status: DISCONTINUED | OUTPATIENT
Start: 2024-01-16 | End: 2024-01-16

## 2024-01-16 RX ORDER — ASPIRIN/CALCIUM CARB/MAGNESIUM 324 MG
1 TABLET ORAL
Qty: 56 | Refills: 0
Start: 2024-01-16 | End: 2024-02-12

## 2024-01-16 RX ORDER — ATORVASTATIN CALCIUM 80 MG/1
40 TABLET, FILM COATED ORAL AT BEDTIME
Refills: 0 | Status: DISCONTINUED | OUTPATIENT
Start: 2024-01-16 | End: 2024-01-17

## 2024-01-16 RX ORDER — IBUPROFEN 200 MG
1 TABLET ORAL
Refills: 0 | DISCHARGE

## 2024-01-16 RX ORDER — ACETAMINOPHEN 500 MG
2 TABLET ORAL
Qty: 84 | Refills: 0
Start: 2024-01-16 | End: 2024-01-29

## 2024-01-16 RX ORDER — HYDROMORPHONE HYDROCHLORIDE 2 MG/ML
0.5 INJECTION INTRAMUSCULAR; INTRAVENOUS; SUBCUTANEOUS
Refills: 0 | Status: DISCONTINUED | OUTPATIENT
Start: 2024-01-16 | End: 2024-01-17

## 2024-01-16 RX ORDER — SODIUM CHLORIDE 9 MG/ML
1000 INJECTION, SOLUTION INTRAVENOUS
Refills: 0 | Status: DISCONTINUED | OUTPATIENT
Start: 2024-01-16 | End: 2024-01-16

## 2024-01-16 RX ADMIN — OXYCODONE HYDROCHLORIDE 10 MILLIGRAM(S): 5 TABLET ORAL at 17:43

## 2024-01-16 RX ADMIN — Medication 1000 MILLIGRAM(S): at 15:18

## 2024-01-16 RX ADMIN — Medication 2000 MILLIGRAM(S): at 17:51

## 2024-01-16 RX ADMIN — Medication 400 MILLIGRAM(S): at 14:48

## 2024-01-16 RX ADMIN — SODIUM CHLORIDE 125 MILLILITER(S): 9 INJECTION, SOLUTION INTRAVENOUS at 12:10

## 2024-01-16 RX ADMIN — Medication 81 MILLIGRAM(S): at 22:50

## 2024-01-16 RX ADMIN — Medication 1000 MILLIGRAM(S): at 22:50

## 2024-01-16 RX ADMIN — PANTOPRAZOLE SODIUM 40 MILLIGRAM(S): 20 TABLET, DELAYED RELEASE ORAL at 07:29

## 2024-01-16 RX ADMIN — HYDROMORPHONE HYDROCHLORIDE 0.5 MILLIGRAM(S): 2 INJECTION INTRAMUSCULAR; INTRAVENOUS; SUBCUTANEOUS at 11:15

## 2024-01-16 RX ADMIN — Medication 30 MILLILITER(S): at 15:12

## 2024-01-16 RX ADMIN — SENNA PLUS 2 TABLET(S): 8.6 TABLET ORAL at 22:50

## 2024-01-16 RX ADMIN — OXYCODONE HYDROCHLORIDE 10 MILLIGRAM(S): 5 TABLET ORAL at 17:13

## 2024-01-16 RX ADMIN — HYDROMORPHONE HYDROCHLORIDE 0.5 MILLIGRAM(S): 2 INJECTION INTRAMUSCULAR; INTRAVENOUS; SUBCUTANEOUS at 12:10

## 2024-01-16 RX ADMIN — Medication 10 MILLIGRAM(S): at 22:50

## 2024-01-16 RX ADMIN — ATORVASTATIN CALCIUM 40 MILLIGRAM(S): 80 TABLET, FILM COATED ORAL at 22:50

## 2024-01-16 RX ADMIN — OXYCODONE HYDROCHLORIDE 10 MILLIGRAM(S): 5 TABLET ORAL at 21:00

## 2024-01-16 RX ADMIN — Medication 1000 MILLIGRAM(S): at 23:00

## 2024-01-16 RX ADMIN — OXYCODONE HYDROCHLORIDE 10 MILLIGRAM(S): 5 TABLET ORAL at 20:11

## 2024-01-16 RX ADMIN — OXYCODONE HYDROCHLORIDE 5 MILLIGRAM(S): 5 TABLET ORAL at 13:26

## 2024-01-16 NOTE — DISCHARGE NOTE PROVIDER - CARE PROVIDER_API CALL
TALI ELLIS  [address] [place] [id number]  Phone: [phone]  Follow Up Time:    TALI ELLIS  [address] [place] [id number]  Phone: [phone]  Follow Up Time:

## 2024-01-16 NOTE — PROGRESS NOTE ADULT - SUBJECTIVE AND OBJECTIVE BOX
Orthopedics Post-op Check  POD 0  Patient seen and examined at bedside. Pain is controlled. Patient is feeling well and denies any CP, SOB, nausea or vomiting.    LABS:                        13.9   6.64  )-----------( 197      ( 16 Jan 2024 09:40 )             40.6     01-16    139  |  109<H>  |  13  ----------------------------<  89  3.9   |  25  |  0.94    Ca    8.5      16 Jan 2024 09:40        Urinalysis Basic - ( 16 Jan 2024 09:40 )    Color: x / Appearance: x / SG: x / pH: x  Gluc: 89 mg/dL / Ketone: x  / Bili: x / Urobili: x   Blood: x / Protein: x / Nitrite: x   Leuk Esterase: x / RBC: x / WBC x   Sq Epi: x / Non Sq Epi: x / Bacteria: x        VITAL SIGNS:  T(C): 36.6 (01-16-24 @ 09:15), Max: 36.8 (01-16-24 @ 06:43)  HR: 46 (01-16-24 @ 10:30) (46 - 79)  BP: 113/73 (01-16-24 @ 10:30) (97/74 - 136/81)  RR: 12 (01-16-24 @ 10:30) (12 - 18)  SpO2: 100% (01-16-24 @ 10:30) (99% - 100%)    PE:   Gen: NAD, resting comfortably  RLE:   Dressing c/d/i  (-) EHL/FHL/TA/GS  (-) SILT L2-S1  Compartments soft and compressible  DP/PT pulses palpable  No calf TTP bilaterally    A/P:  63yMale Stable POD 0  s/p R KRISTAN    Motor and Sensory deficits likely secondary to residual affects of anesthetic block. Will continue to monitor for improvement.   Follow up postoperative labs  WBAT, PT/OT  Posterior Hip precautions, Abduction Pillow  Pain management PRN  Continue PPx antibiotics x24 hrs.   DVT PPx: hold until POD1.   Incentive spirometry  Dispo: Pending PT Eval.

## 2024-01-16 NOTE — CONSULT NOTE ADULT - NS ATTEND AMEND GEN_ALL_CORE FT
Chart and meds reviewed.  Case d/w KIRSTIN Gutierrez.  Agree with assessment and plan as outlined by KIRSTIN Gutierrez.

## 2024-01-16 NOTE — PATIENT PROFILE ADULT - FALL HARM RISK - RISK INTERVENTIONS
Assistance OOB with selected safe patient handling equipment/Communicate Fall Risk and Risk Factors to all staff, patient, and family/Discuss with provider need for PT consult/Monitor gait and stability/Provide patient with walking aids - walker, cane, crutches/Reinforce activity limits and safety measures with patient and family/Visual Cue: Yellow wristband/Bed in lowest position, wheels locked, appropriate side rails in place/Call bell, personal items and telephone in reach/Instruct patient to call for assistance before getting out of bed or chair/Non-slip footwear when patient is out of bed/[place] to call system/Physically safe environment - no spills, clutter or unnecessary equipment/Purposeful Proactive Rounding/Room/bathroom lighting operational, light cord in reach Assistance OOB with selected safe patient handling equipment/Communicate Fall Risk and Risk Factors to all staff, patient, and family/Discuss with provider need for PT consult/Monitor gait and stability/Provide patient with walking aids - walker, cane, crutches/Reinforce activity limits and safety measures with patient and family/Visual Cue: Yellow wristband/Bed in lowest position, wheels locked, appropriate side rails in place/Call bell, personal items and telephone in reach/Instruct patient to call for assistance before getting out of bed or chair/Non-slip footwear when patient is out of bed/[place] to call system/Physically safe environment - no spills, clutter or unnecessary equipment/Purposeful Proactive Rounding/Room/bathroom lighting operational, light cord in reach

## 2024-01-16 NOTE — DISCHARGE NOTE PROVIDER - NSDCFUADDINST_GEN_ALL_CORE_FT
Discharge Instructions for Right Total Hip Arthroplasty:    1. ACTIVITY: WBAT. Rolling walker. Posterior Hip Dislocation Precautions. Abduction Pillow while in bed for 6 weeks. Daily PT.  2. CALL FOR: fever over 101, wound redness, drainage or open area, calf pain/calf swelling.  3. BANDAGE: Change dressing to a new Mepilex Ag bandage POD7 (1/23/24). May change sooner if dressing saturated or falling off. DO NOT REMOVE BANDAGE TO CHECK WOUND ON INTAKE.  4. STAPLES: RN Remove Staples POD14 (1/30/24).  5. SHOWER: Okay to shower. Do not soak, submerge or let shower stream beat on dressing/wound.  6. DVT PE Prophylaxis: Aspirin 81mg PO BID x 28 days. See Med Rec.  7. GI: Continue Protonix daily while on Anticoagulant. eRx has been sent to your pharmacy.  8. FOLLOW UP: Dr. Maya in 1 month. Call office to schedule.  9. MEDICATION: eRX sent to your pharmacy for  if you go home.   10. **Call office if medications not covered under your insurance, especially BLOOD CLOT PREVENTION/anticoagulant medication.

## 2024-01-16 NOTE — DISCHARGE NOTE PROVIDER - HOSPITAL COURSE
H&P:  Pt is a 63y Male    PAST MEDICAL & SURGICAL HISTORY:  GERD (gastroesophageal reflux disease)      HTN (hypertension)      HLD (hyperlipidemia)      Anxiety      OA (osteoarthritis)      Lumbar herniated disc      History of meniscectomy of left knee      H/O left wrist surgery      Fracture of left leg      H/O right inguinal hernia repair           Now s/p Right Total Hip Arthroplasty. Pt is afebrile with stable vital signs. Pain is controlled. Alert and Oriented. Exam reveals intact EHL FHL TA GS, +DP. Dressing is clean and dry.    Hospital Course:  Patient presented to [facility] medically cleared for elective Right Total Hip Replacement Surgery, having failed outpatient conservative management. Prophylactic antibiotics were started before the procedure and continued for 24 hours. They were admitted after surgery to the orthopedic floor. There were no complications during the hospital stay. All home medications were continued.     Routine consults were obtained from Physical Therapy for twice daily PT and from the Hospitalist for Medical Co-management. Patient was placed on anticoagulation. Pertinent home medications were continued. Daily labs were followed.      POD 0 pt was stable overnight. No major events post-op. Pt received PT twice daily. The plan is for DC to home with home PT** or to Rehab for ongoing PT**. The orthopedic Attending is aware and agrees.      **POD1 DC DOCUMENTATION** (Keep or Delete depending on scenario)  The patient had no post-operative complications and clinically progressed faster than anticipated. The following condition(s) were actively treated during the hospital stay:  HTN  HLD  GERD  Anxiety  The patient met criteria for discharge before the 2nd night of the stay. The patient was appropriately and safely discharged home with home PT.    ******INCOMPLETE NOTE IN PREPARATION FOR DISPO PLANNING*******  ******INCOMPLETE NOTE IN PREPARATION FOR DISPO PLANNING*******  ******INCOMPLETE NOTE IN PREPARATION FOR DISPO PLANNING******* H&P:  Pt is a 63y Male    PAST MEDICAL & SURGICAL HISTORY:  GERD (gastroesophageal reflux disease)      HTN (hypertension)      HLD (hyperlipidemia)      Anxiety      OA (osteoarthritis)      Lumbar herniated disc      History of meniscectomy of left knee      H/O left wrist surgery      Fracture of left leg      H/O right inguinal hernia repair           Now s/p Right Total Hip Arthroplasty. Pt is afebrile with stable vital signs. Pain is controlled. Alert and Oriented. Exam reveals intact EHL FHL TA GS, +DP. Dressing is clean and dry.    Hospital Course:  Patient presented to [facility] medically cleared for elective Right Total Hip Replacement Surgery, having failed outpatient conservative management. Prophylactic antibiotics were started before the procedure and continued for 24 hours. They were admitted after surgery to the orthopedic floor. There were no complications during the hospital stay. All home medications were continued.     Routine consults were obtained from Physical Therapy for twice daily PT and from the Hospitalist for Medical Co-management. Patient was placed on anticoagulation. Pertinent home medications were continued. Daily labs were followed.      POD 0 pt was stable overnight. No major events post-op. Pt received PT twice daily. The plan is for DC to home with home PT** or to Rehab for ongoing PT**. The orthopedic Attending is aware and agrees.      **POD1 DC DOCUMENTATION** (Keep or Delete depending on scenario)  The patient had no post-operative complications and clinically progressed faster than anticipated. The following condition(s) were actively treated during the hospital stay:  HTN  HLD  GERD  Anxiety  The patient met criteria for discharge before the 2nd night of the stay. The patient was appropriately and safely discharged home with home PT.    ******INCOMPLETE NOTE IN PREPARATION FOR DISPO PLANNING*******  ******INCOMPLETE NOTE IN PREPARATION FOR DISPO PLANNING*******  ******INCOMPLETE NOTE IN PREPARATION FOR DISPO PLANNING******* H&P:  Pt is a 63y Male    PAST MEDICAL & SURGICAL HISTORY:  GERD (gastroesophageal reflux disease)      HTN (hypertension)      HLD (hyperlipidemia)      Anxiety      OA (osteoarthritis)      Lumbar herniated disc      History of meniscectomy of left knee      H/O left wrist surgery      Fracture of left leg      H/O right inguinal hernia repair           Now s/p Right Total Hip Arthroplasty. Pt is afebrile with stable vital signs. Pain is controlled. Alert and Oriented. Exam reveals intact EHL FHL TA GS, +DP. Dressing is clean and dry.    Hospital Course:  Patient presented to [facility] medically cleared for elective Right Total Hip Replacement Surgery, having failed outpatient conservative management. Prophylactic antibiotics were started before the procedure and continued for 24 hours. They were admitted after surgery to the orthopedic floor. There were no complications during the hospital stay. All home medications were continued.     Routine consults were obtained from Physical Therapy for twice daily PT and from the Hospitalist for Medical Co-management. Patient was placed on anticoagulation. Pertinent home medications were continued. Daily labs were followed.      POD 0 pt was stable overnight. No major events post-op. Pt received PT twice daily. The plan is for DC to home with home PT. The orthopedic Attending is aware and agrees.    The patient had no post-operative complications and clinically progressed faster than anticipated. The following condition(s) were actively treated during the hospital stay:  HTN  HLD  GERD  Anxiety  The patient met criteria for discharge before the 2nd night of the stay. The patient was appropriately and safely discharged home with home PT.     H&P:  Pt is a 63y Male    PAST MEDICAL & SURGICAL HISTORY:  GERD (gastroesophageal reflux disease)      HTN (hypertension)      HLD (hyperlipidemia)      Anxiety      OA (osteoarthritis)      Lumbar herniated disc      History of meniscectomy of left knee      H/O left wrist surgery      Fracture of left leg      H/O right inguinal hernia repair           Now s/p Right Total Hip Arthroplasty. Pt is afebrile with stable vital signs. Pain is controlled. Alert and Oriented. Exam reveals intact EHL FHL TA GS, +DP. Dressing is clean and dry.    Hospital Course:  Patient presented to [facility] medically cleared for elective Right Total Hip Replacement Surgery, having failed outpatient conservative management. Prophylactic antibiotics were started before the procedure and continued for 24 hours. They were admitted after surgery to the orthopedic floor. There were no complications during the hospital stay. All home medications were continued.     Routine consults were obtained from Physical Therapy for twice daily PT and from the Hospitalist for Medical Co-management. Patient was placed on anticoagulation. Pertinent home medications were continued. Daily labs were followed.      POD 0 pt was stable overnight. No major events post-op. Pt received PT twice daily. The plan is for DC to home with home PT. The orthopedic Attending is aware and agrees.    The patient had no post-operative complications and clinically progressed faster than anticipated. The following condition(s) were actively treated during the hospital stay:  HTN  HLD  GERD  Anxiety  The patient met criteria for discharge before the 2nd night of the stay. The patient was appropriately and safely discharged home with home PT.     H&P:  Pt is a 63y Male    PAST MEDICAL & SURGICAL HISTORY:  GERD (gastroesophageal reflux disease)      HTN (hypertension)      HLD (hyperlipidemia)      Anxiety      OA (osteoarthritis)      Lumbar herniated disc      History of meniscectomy of left knee      H/O left wrist surgery      Fracture of left leg      H/O right inguinal hernia repair           Now s/p Right Total Hip Arthroplasty. Pt is afebrile with stable vital signs. Pain is controlled. Alert and Oriented. Exam reveals intact EHL FHL TA GS, +DP. Dressing is clean and dry.    Hospital Course:  Patient presented to [facility] medically cleared for elective Right Total Hip Replacement Surgery, having failed outpatient conservative management. Prophylactic antibiotics were started before the procedure and continued for 24 hours. They were admitted after surgery to the orthopedic floor. There were no complications during the hospital stay. All home medications were continued.     Routine consults were obtained from Physical Therapy for twice daily PT and from the Hospitalist for Medical Co-management. Patient was placed on anticoagulation. Pertinent home medications were continued. Daily labs were followed.      POD 0 pt was stable overnight. No major events post-op. Pt received PT twice daily. The plan is for DC to home with home PT. The orthopedic Attending is aware and agrees.      The patient had no post-operative complications and clinically progressed faster than anticipated. The following condition(s) were actively treated during the hospital stay:  HTN  HLD  GERD  Anxiety  The patient met criteria for discharge before the 2nd night of the stay. The patient was appropriately and safely discharged home with home PT. H&P:  Pt is a 63y Male    PAST MEDICAL & SURGICAL HISTORY:  GERD (gastroesophageal reflux disease)      HTN (hypertension)      HLD (hyperlipidemia)      Anxiety      OA (osteoarthritis)      Lumbar herniated disc      History of meniscectomy of left knee      H/O left wrist surgery      Fracture of left leg      H/O right inguinal hernia repair           Now s/p Right Total Hip Arthroplasty. Pt is afebrile with stable vital signs. Pain is controlled. Alert and Oriented. Exam reveals intact EHL FHL TA GS, +DP. Dressing is clean and dry.    Hospital Course:  Patient presented to [facility] medically cleared for elective Right Total Hip Replacement Surgery, having failed outpatient conservative management. Prophylactic antibiotics were started before the procedure and continued for 24 hours. They were admitted after surgery to the orthopedic floor. There were no complications during the hospital stay. All home medications were continued.     Routine consults were obtained from Physical Therapy for twice daily PT and from the Hospitalist for Medical Co-management. Patient was placed on anticoagulation. Pertinent home medications were continued. Daily labs were followed.      POD 0 pt was stable overnight. No major events post-op. Pt received PT twice daily. The plan is for DC to home with home PT. The orthopedic Attending is aware and agrees.      The patient had no post-operative complications and clinically progressed faster than anticipated. The following condition(s) were actively treated during the hospital stay:  HTN  HLD  GERD  Anxiety  The patient met criteria for discharge before the 2nd night of the stay. The patient was appropriately and safely discharged home with home PT.

## 2024-01-16 NOTE — DISCHARGE NOTE PROVIDER - NSDCMRMEDTOKEN_GEN_ALL_CORE_FT
Aspirin EC 81 mg oral delayed release tablet: 1 tab(s) orally 2 times a day MDD: 2  diazePAM 10 mg oral tablet: 1 orally prn  hydroCHLOROthiazide 25 mg oral tablet: 1 orally once a day  MiraLax oral powder for reconstitution: 17 gram(s) orally once a day as needed for  constipation  omeprazole 20 mg oral delayed release capsule: 1 delayed release capsule orally once a day STOP while taking post-op Protonix course, RESUME when Protonix course completed  oxyCODONE 5 mg oral tablet: 1 tab(s) orally every 4 hours as needed for  severe pain MDD: 6  Protonix 40 mg oral delayed release tablet: 1 tab(s) orally once a day  Senna 8.6 mg oral tablet: 2 tab(s) orally once a day (at bedtime) as needed for  constipation  simvastatin 20 mg oral tablet: 1 orally once a day  Tylenol Extra Strength 500 mg oral tablet: 2 tab(s) orally every 8 hours

## 2024-01-16 NOTE — PATIENT PROFILE ADULT - NSPROGENOTHERPROVIDER_GEN_A_NUR
Sent to [place] [facility] in error. Rerouted to [place] [facility].   none Paramedian Forehead Flap Text: A decision was made to reconstruct the defect utilizing an interpolation axial flap and a staged reconstruction.  A telfa template was made of the defect.  This telfa template was then used to outline the paramedian forehead pedicle flap.  The donor area for the pedicle flap was then injected with anesthesia.  The flap was excised through the skin and subcutaneous tissue down to the layer of the underlying musculature.  The pedicle flap was carefully excised within this deep plane to maintain its blood supply.  The edges of the donor site were undermined.   The donor site was closed in a primary fashion.  The pedicle was then rotated into position and sutured.  Once the tube was sutured into place, adequate blood supply was confirmed with blanching and refill.  The pedicle was then wrapped with xeroform gauze and dressed appropriately with a telfa and gauze bandage to ensure continued blood supply and protect the attached pedicle.

## 2024-01-16 NOTE — DISCHARGE NOTE PROVIDER - NSDCFUSCHEDAPPT_GEN_ALL_CORE_FT
[facility]  [address] [facility]  Scheduled Appointment: 02/21/2024     [facility]  [address] [facility]  Scheduled Appointment: 02/21/2024

## 2024-01-16 NOTE — ASU PREOP CHECKLIST - ASSESSMENT, HISTORY & PHYSICAL COMPLETED AND ON MEDICAL RECORD
PHYSICIAN NEXT STEPS:  Call the Patient    CHIEF COMPLAINT:  Chief Complaint/Protocol Used: PCP Call - No Triage  Onset: Tightening pain in stomach and back-24 weeks ago      ASSESSMENT:  ? Onset: Tightening pain in stomach and back-24 weeks ago  -------------------------------------------------------    DISPOSITION:  Disposition Recommendation: Call PCP Now  Questions that led to disposition:  ? Nursing judgment or information in reference  Patient Directed To: Unspecified  Patient Intended Action: Unspecified    CALL NOTES:  10/06/2020 at 12:42 PM by Luisana Parra  ? Patient has a Call PCP now disposition for the doctor to advise patient further for patient having a tightening pain in stomach and back, pressure in the vaginal area with mucus that started 3 days ago. Patient is 24 weeks pregnant. Nurse paged via   perfect donny Muñiz per patient's disposition and for the doctor to call the patient to advise further.     DISPOSITION OVERRIDE/PROVIDER CONSULT:  Disposition Override: N/A  Override Source: Unspecified  Consulted with PCP: No  Consulted with On-Call Physician: No    CALLER CONTACT INFO:  Name: Anika Curiel (Self)  Phone 1: [phone] (Home Phone)  Phone 2: [phone] (Work Phone)  Phone 3: [phone] (Mobile) - Preferred      ENCOUNTER STARTED:  10/06/20 12:29:53 PM  ENCOUNTER ASSIGNED TO/CLOSED BY:  Luisana Parra @ 10/06/20 12:44:07 PM      -------------------------------------------------------    UNDERSTANDS CARE ADVICE: No    AGREES WITH CARE ADVICE: No    WILL FOLLOW CARE ADVICE: No    -------------------------------------------------------   done

## 2024-01-16 NOTE — CONSULT NOTE ADULT - SUBJECTIVE AND OBJECTIVE BOX
PCP: Vickie Zimmerman PCP. Marino Dawson Cardio     CHIEF COMPLAINT:     HISTORY OF THE PRESENT ILLNESS: this is a 62 yo M  with PMH of HLD, HTN, OA of right hip presenting with progressive pain with ambulation, difficulty with weight bearing, and INC pain at night, who failed the usual modalities for pain. He is now s/p right hip arthoplasty.  He is seen in PACU, IN NAD, nerve block still in affect. denies any CP or SOB.  We are consulted for medical management    PAST MEDICAL & SURGICAL HISTORY:  GERD (gastroesophageal reflux disease)      HTN (hypertension)      HLD (hyperlipidemia)      Anxiety      OA (osteoarthritis)      Lumbar herniated disc      History of meniscectomy of left knee      H/O left wrist surgery      Fracture of left leg      H/O right inguinal hernia repair          FAMILY HISTORY: Family history non contributory to current condition.     SOCIAL HISTORY:  no smoking, no alcohol, no drugs    ALLERGIES: no known drug allergies     HOME MEDS:  Home Medications:  diazePAM 10 mg oral tablet: 1 orally prn (16 Jan 2024 06:56)  hydroCHLOROthiazide 25 mg oral tablet: 1 orally once a day (16 Jan 2024 06:56)  omeprazole 20 mg oral delayed release capsule: 1 delayed release capsule orally once a day STOP while taking post-op Protonix course, RESUME when Protonix course completed (16 Jan 2024 09:31)  simvastatin 20 mg oral tablet: 1 orally once a day (16 Jan 2024 06:56)      REVIEW OF SYSTEMS:   All 10 systems reviewed in detailed and found to be negative with the exception of what has already been described above    Vital Signs Last 24 Hrs  T(C): 36.6 (16 Jan 2024 09:15), Max: 36.8 (16 Jan 2024 06:43)  T(F): 97.8 (16 Jan 2024 09:15), Max: 98.2 (16 Jan 2024 06:43)  HR: 68 (16 Jan 2024 10:00) (63 - 79)  BP: 105/76 (16 Jan 2024 10:00) (97/74 - 136/81)  BP(mean): --  RR: 15 (16 Jan 2024 10:00) (15 - 18)  SpO2: 100% (16 Jan 2024 10:00) (99% - 100%)    Parameters below as of 16 Jan 2024 10:00  Patient On (Oxygen Delivery Method): nasal cannula  O2 Flow (L/min): 3      MEDICATIONS  (STANDING):  atorvastatin 40 milliGRAM(s) Oral at bedtime  hydrochlorothiazide 25 milliGRAM(s) Oral daily  pantoprazole    Tablet 40 milliGRAM(s) Oral before breakfast  tranexamic acid IVPB 1000 milliGRAM(s) IV Intermittent once  tranexamic acid IVPB 1000 milliGRAM(s) IV Intermittent once    MEDICATIONS  (PRN):      HEENT:  pupils equal and reactive, EOMI, no oropharyngeal lesions, erythema, exudates, oral thrush  NECK:   supple, no carotid bruits, no palpable lymph nodes, no thyromegaly  CV:  +S1, +S2, regular, no murmurs or rubs  RESP:   lungs clear to auscultation bilaterally, no wheezing, rales, rhonchi, good air entry bilaterally  GI:  abdomen soft, non-tender, non-distended, normal BS, no bruits, no abdominal masses, no palpable masses  EXT:  post op dsg intact. no clubbing, no cyanosis, no edema, no calf pain, swelling or erythema  NEURO:  AAOX3, no focal neurological deficits, follows all commands, able to move upper extremities spontaneously  SKIN:  no ulcers, lesions or rashes    LABS:      post op labs pending       IMPRESSION:  yo with above pmh a/w:  # OA of right hip   # S/P right hip arthoplasty     POD#0  pain control  PT eval  Bowel regimen  IVF's  Finish ABXs  regular diet  PPI  VTE prophylaxis  monitor CBC/BMP      # HTN  pt at high risk for fluctuations in B/P 2/2 anesthesia, pain, hypovolemia and use of narcotics, placing pt at high risk for MI/CVA  monitor B/P closely  cont HCTZ     # HLD  cont statin     # Vte prophylaxis  ASA 81mg BID   [employer]  INC mobility      Thank you for the consult, will follow  plan discussed with Dr. Yao        PCP: Vickie Zimmerman PCP. Marino Dawson Cardio     CHIEF COMPLAINT:     HISTORY OF THE PRESENT ILLNESS: this is a 62 yo M  with PMH of HLD, HTN, OA of right hip presenting with progressive pain with ambulation, difficulty with weight bearing, and INC pain at night, who failed the usual modalities for pain. He is now s/p right hip arthoplasty.  He is seen in PACU, IN NAD, nerve block still in affect. denies any CP or SOB.  We are consulted for medical management    PAST MEDICAL & SURGICAL HISTORY:  GERD (gastroesophageal reflux disease)      HTN (hypertension)      HLD (hyperlipidemia)      Anxiety      OA (osteoarthritis)      Lumbar herniated disc      History of meniscectomy of left knee      H/O left wrist surgery      Fracture of left leg      H/O right inguinal hernia repair          FAMILY HISTORY: Family history non contributory to current condition.     SOCIAL HISTORY:  no smoking, no alcohol, no drugs    ALLERGIES: no known drug allergies     HOME MEDS:  Home Medications:  diazePAM 10 mg oral tablet: 1 orally prn (16 Jan 2024 06:56)  hydroCHLOROthiazide 25 mg oral tablet: 1 orally once a day (16 Jan 2024 06:56)  omeprazole 20 mg oral delayed release capsule: 1 delayed release capsule orally once a day STOP while taking post-op Protonix course, RESUME when Protonix course completed (16 Jan 2024 09:31)  simvastatin 20 mg oral tablet: 1 orally once a day (16 Jan 2024 06:56)      REVIEW OF SYSTEMS:   All 10 systems reviewed in detailed and found to be negative with the exception of what has already been described above    Vital Signs Last 24 Hrs  T(C): 36.6 (16 Jan 2024 09:15), Max: 36.8 (16 Jan 2024 06:43)  T(F): 97.8 (16 Jan 2024 09:15), Max: 98.2 (16 Jan 2024 06:43)  HR: 68 (16 Jan 2024 10:00) (63 - 79)  BP: 105/76 (16 Jan 2024 10:00) (97/74 - 136/81)  BP(mean): --  RR: 15 (16 Jan 2024 10:00) (15 - 18)  SpO2: 100% (16 Jan 2024 10:00) (99% - 100%)    Parameters below as of 16 Jan 2024 10:00  Patient On (Oxygen Delivery Method): nasal cannula  O2 Flow (L/min): 3      MEDICATIONS  (STANDING):  atorvastatin 40 milliGRAM(s) Oral at bedtime  hydrochlorothiazide 25 milliGRAM(s) Oral daily  pantoprazole    Tablet 40 milliGRAM(s) Oral before breakfast  tranexamic acid IVPB 1000 milliGRAM(s) IV Intermittent once  tranexamic acid IVPB 1000 milliGRAM(s) IV Intermittent once    MEDICATIONS  (PRN):      HEENT:  pupils equal and reactive, EOMI, no oropharyngeal lesions, erythema, exudates, oral thrush  NECK:   supple, no carotid bruits, no palpable lymph nodes, no thyromegaly  CV:  +S1, +S2, regular, no murmurs or rubs  RESP:   lungs clear to auscultation bilaterally, no wheezing, rales, rhonchi, good air entry bilaterally  GI:  abdomen soft, non-tender, non-distended, normal BS, no bruits, no abdominal masses, no palpable masses  EXT:  post op dsg intact. no clubbing, no cyanosis, no edema, no calf pain, swelling or erythema  NEURO:  AAOX3, no focal neurological deficits, follows all commands, able to move upper extremities spontaneously  SKIN:  no ulcers, lesions or rashes    LABS:      post op labs pending       IMPRESSION:  yo with above pmh a/w:  # OA of right hip   # S/P right hip arthoplasty     POD#0  pain control  PT eval  Bowel regimen  IVF's  Finish ABXs  regular diet  PPI  VTE prophylaxis  monitor CBC/BMP      # HTN  pt at high risk for fluctuations in B/P 2/2 anesthesia, pain, hypovolemia and use of narcotics, placing pt at high risk for MI/CVA  monitor B/P closely  cont HCTZ     # HLD  cont statin     # Vte prophylaxis  ASA 81mg BID   [employer]  INC mobility      Thank you for the consult, will follow  plan discussed with Dr. Yao

## 2024-01-17 ENCOUNTER — TRANSCRIPTION ENCOUNTER (OUTPATIENT)
Age: 64
End: 2024-01-17

## 2024-01-17 VITALS
TEMPERATURE: 98 F | RESPIRATION RATE: 18 BRPM | HEART RATE: 93 BPM | OXYGEN SATURATION: 96 % | DIASTOLIC BLOOD PRESSURE: 91 MMHG | SYSTOLIC BLOOD PRESSURE: 125 MMHG

## 2024-01-17 LAB
ANION GAP SERPL CALC-SCNC: 5 MMOL/L — SIGNIFICANT CHANGE UP (ref 5–17)
BUN SERPL-MCNC: 13 MG/DL — SIGNIFICANT CHANGE UP (ref 7–23)
CALCIUM SERPL-MCNC: 9.1 MG/DL — SIGNIFICANT CHANGE UP (ref 8.5–10.1)
CHLORIDE SERPL-SCNC: 105 MMOL/L — SIGNIFICANT CHANGE UP (ref 96–108)
CO2 SERPL-SCNC: 29 MMOL/L — SIGNIFICANT CHANGE UP (ref 22–31)
CREAT SERPL-MCNC: 0.92 MG/DL — SIGNIFICANT CHANGE UP (ref 0.5–1.3)
EGFR: 92 ML/MIN/1.73M2 — SIGNIFICANT CHANGE UP
GLUCOSE SERPL-MCNC: 124 MG/DL — HIGH (ref 70–99)
HCT VFR BLD CALC: 43.6 % — SIGNIFICANT CHANGE UP (ref 39–50)
HGB BLD-MCNC: 15 G/DL — SIGNIFICANT CHANGE UP (ref 13–17)
MCHC RBC-ENTMCNC: 31.6 PG — SIGNIFICANT CHANGE UP (ref 27–34)
MCHC RBC-ENTMCNC: 34.4 GM/DL — SIGNIFICANT CHANGE UP (ref 32–36)
MCV RBC AUTO: 91.8 FL — SIGNIFICANT CHANGE UP (ref 80–100)
PLATELET # BLD AUTO: 210 K/UL — SIGNIFICANT CHANGE UP (ref 150–400)
POTASSIUM SERPL-MCNC: 3.9 MMOL/L — SIGNIFICANT CHANGE UP (ref 3.5–5.3)
POTASSIUM SERPL-SCNC: 3.9 MMOL/L — SIGNIFICANT CHANGE UP (ref 3.5–5.3)
RBC # BLD: 4.75 M/UL — SIGNIFICANT CHANGE UP (ref 4.2–5.8)
RBC # FLD: 12.1 % — SIGNIFICANT CHANGE UP (ref 10.3–14.5)
SODIUM SERPL-SCNC: 139 MMOL/L — SIGNIFICANT CHANGE UP (ref 135–145)
WBC # BLD: 12.52 K/UL — HIGH (ref 3.8–10.5)
WBC # FLD AUTO: 12.52 K/UL — HIGH (ref 3.8–10.5)

## 2024-01-17 RX ADMIN — Medication 1000 MILLIGRAM(S): at 05:15

## 2024-01-17 RX ADMIN — PANTOPRAZOLE SODIUM 40 MILLIGRAM(S): 20 TABLET, DELAYED RELEASE ORAL at 05:15

## 2024-01-17 RX ADMIN — Medication 101.6 MILLIGRAM(S): at 05:15

## 2024-01-17 RX ADMIN — OXYCODONE HYDROCHLORIDE 10 MILLIGRAM(S): 5 TABLET ORAL at 05:15

## 2024-01-17 RX ADMIN — Medication 325 MILLIGRAM(S): at 10:23

## 2024-01-17 RX ADMIN — Medication 2000 MILLIGRAM(S): at 01:24

## 2024-01-17 RX ADMIN — Medication 1 MILLIGRAM(S): at 10:22

## 2024-01-17 RX ADMIN — CELECOXIB 200 MILLIGRAM(S): 200 CAPSULE ORAL at 10:22

## 2024-01-17 RX ADMIN — CELECOXIB 200 MILLIGRAM(S): 200 CAPSULE ORAL at 10:23

## 2024-01-17 RX ADMIN — Medication 1000 MILLIGRAM(S): at 05:59

## 2024-01-17 RX ADMIN — Medication 1 TABLET(S): at 10:22

## 2024-01-17 RX ADMIN — Medication 81 MILLIGRAM(S): at 10:22

## 2024-01-17 RX ADMIN — OXYCODONE HYDROCHLORIDE 10 MILLIGRAM(S): 5 TABLET ORAL at 06:00

## 2024-01-17 NOTE — DISCHARGE NOTE NURSING/CASE MANAGEMENT/SOCIAL WORK - NSPROMEDSDISPOSITION_GEN_A_NUR
pt onstructed to give nurse on floor and not to take own medicine pt onstructed to give nurse on floor and not to take own medicine  In lock box, 2 Valium pills returned to pt

## 2024-01-17 NOTE — OCCUPATIONAL THERAPY INITIAL EVALUATION ADULT - PERTINENT HX OF CURRENT PROBLEM, REHAB EVAL
Per EMR, pt is a 62 y/o male with PMHx of HLD, HTN, OA of right hip presenting with progressive pain with ambulation, difficulty with weight bearing, and INC pain at night, who failed the usual modalities for pain. He is now s/p right hip arthoplasty.

## 2024-01-17 NOTE — OCCUPATIONAL THERAPY INITIAL EVALUATION ADULT - ADDITIONAL COMMENTS
Pt resides in a  alone with no OSVALDO, has a walk in shower x 2 grab bars, (I) with ADL/IADL tasks, walked with SAC/RW prn recently 2* hip. Pt ordered a RTS and a hip kit for surgery.

## 2024-01-17 NOTE — DISCHARGE NOTE NURSING/CASE MANAGEMENT/SOCIAL WORK - PATIENT PORTAL LINK FT
DIABETES WITHOUT RETINOPATHY:  RETURN FOR FOLLOW-UP AS SCHEDULED FOR DILATED FUNDUS EXAM. You can access the FollowMyHealth Patient Portal offered by [facility] by registering at the following website: [email or web address]. By joining [employer]’s FollowMyHealth portal, you will also be able to view your health information using other applications (apps) compatible with our system.

## 2024-01-17 NOTE — OCCUPATIONAL THERAPY INITIAL EVALUATION ADULT - ADL RETRAINING, OT EVAL
Pt will perform footwear management using hip kit with supervision assist in 1-2 tx sessions. Pt will perform LBD using hip kit with supervision assist in 1-2 tx sessions. Pt will perform toileting with supervision assist in 1-2 tx sessions.

## 2024-01-17 NOTE — OCCUPATIONAL THERAPY INITIAL EVALUATION ADULT - NSACTIVITYREC_GEN_A_OT
Pharmacy Note - Admission Medication History    Pertinent Provider Information: The only recent prescription fill is for furosemide 40mg bid.  Patient reports that furosemide and mirtazapine are the only medications that he has been taking recently.   Says he is not taking anything for depression or anxiety, no medication for stomach acid and not taking rifaximin nor acamprosate.     ______________________________________________________________________    Prior To Admission (PTA) med list completed and updated in EMR.       PTA Med List   Medication Sig Last Dose     furosemide (LASIX) 40 MG tablet TAKE 1 TABLET BY MOUTH TWICE A DAY (09:00AM & 06:00PM) Past Week at Unknown time     mirtazapine (REMERON) 15 MG tablet Take 1 tablet (15 mg) by mouth At Bedtime Past Week at Unknown time       Information source(s): Patient and [facility]  Method of interview communication: in-person    Summary of Changes to PTA Med List  all    Patient was asked about OTC/herbal products specifically.  PTA med list reflects this.    I    The information provided in this note is only as accurate as the sources available at the time of the update(s).    Thank you for the opportunity to participate in the care of this patient.    Darshan Vargas [facility]  2/25/2022 1:09 PM     Pt presents with impaired balance, endurance and muscle strength that will benefit from skilled OT to improve independence in ADLs, reduce fall risk and chance for readmission. Pt educated on posterior hip precautions and it's impact on ADL/IADL and functional mobility tasks. Patient provided with handouts further going over topics discussed, how to use hip kit, and DME/AE recommendations.

## 2024-01-17 NOTE — PROGRESS NOTE ADULT - SUBJECTIVE AND OBJECTIVE BOX
Patient seen and examined at bedside. Pain is controlled. Patient is feeling well and denies any CP, SOB, nausea or vomiting.    LABS:                        13.9   6.64  )-----------( 197      ( 16 Jan 2024 09:40 )             40.6     01-16    139  |  109<H>  |  13  ----------------------------<  89  3.9   |  25  |  0.94    Ca    8.5      16 Jan 2024 09:40        Urinalysis Basic - ( 16 Jan 2024 09:40 )    Color: x / Appearance: x / SG: x / pH: x  Gluc: 89 mg/dL / Ketone: x  / Bili: x / Urobili: x   Blood: x / Protein: x / Nitrite: x   Leuk Esterase: x / RBC: x / WBC x   Sq Epi: x / Non Sq Epi: x / Bacteria: x        VITAL SIGNS:  T(C): 36.3 (01-17-24 @ 04:00), Max: 36.8 (01-16-24 @ 06:43)  HR: 75 (01-17-24 @ 04:00) (46 - 85)  BP: 117/76 (01-17-24 @ 04:00) (97/74 - 136/81)  RR: 16 (01-17-24 @ 04:00) (12 - 18)  SpO2: 100% (01-17-24 @ 04:00) (94% - 100%)      PE:   Gen: NAD, resting comfortably  RLE:   Dressing c/d/i  + EHL/FHL/TA/GS  + SILT L2-S1  Compartments soft and compressible  DP/PT pulses palpable  No calf TTP bilaterally    A/P:  63yMale Stable POD 1 s/p R KRISTAN    Follow up AM  labs  WBAT, PT/OT  Posterior Hip precautions, Abduction Pillow  Pain management PRN  Continue PPx antibiotics x24 hrs.   DVT PPx  Incentive spirometry  Dispo: Home

## 2024-01-17 NOTE — OCCUPATIONAL THERAPY INITIAL EVALUATION ADULT - GENERAL OBSERVATIONS, REHAB EVAL
Pt rec'd/left sitting in bedside hip chair, chair alarmed, lines intact, +abduction wedge, CB/TT/phone IR, agreeable to OT IE.

## 2024-01-17 NOTE — OCCUPATIONAL THERAPY INITIAL EVALUATION ADULT - COGNITIVE, VISUAL PERCEPTUAL, OT EVAL
Patient will recall and adhere to 3/3 posterior hip precautions with 100% accuracy within 1-2 tx sessions in order to increase safety with ADL/IADL and functional mobility participation

## 2024-01-17 NOTE — DISCHARGE NOTE NURSING/CASE MANAGEMENT/SOCIAL WORK - NSDCPEFALRISK_GEN_ALL_CORE
For information on Fall & Injury Prevention, visit: [email or web address] OR  [email or web address] OR  https://www.cdc.gov/steadi/patient.html

## 2024-01-18 LAB — SURGICAL PATHOLOGY STUDY: SIGNIFICANT CHANGE UP

## 2024-01-22 ENCOUNTER — TRANSCRIPTION ENCOUNTER (OUTPATIENT)
Age: 64
End: 2024-01-22

## 2024-01-23 DIAGNOSIS — E78.5 HYPERLIPIDEMIA, UNSPECIFIED: ICD-10-CM

## 2024-01-23 DIAGNOSIS — I10 ESSENTIAL (PRIMARY) HYPERTENSION: ICD-10-CM

## 2024-01-23 DIAGNOSIS — M16.11 UNILATERAL PRIMARY OSTEOARTHRITIS, RIGHT HIP: ICD-10-CM

## 2024-01-23 DIAGNOSIS — K21.9 GASTRO-ESOPHAGEAL REFLUX DISEASE WITHOUT ESOPHAGITIS: ICD-10-CM

## 2024-01-23 DIAGNOSIS — F41.9 ANXIETY DISORDER, UNSPECIFIED: ICD-10-CM

## 2024-02-07 ENCOUNTER — TRANSCRIPTION ENCOUNTER (OUTPATIENT)
Age: 64
End: 2024-02-07

## 2024-02-09 PROBLEM — M51.26 OTHER INTERVERTEBRAL DISC DISPLACEMENT, LUMBAR REGION: Chronic | Status: ACTIVE | Noted: 2024-01-03

## 2024-02-09 PROBLEM — M19.90 UNSPECIFIED OSTEOARTHRITIS, UNSPECIFIED SITE: Chronic | Status: ACTIVE | Noted: 2024-01-03

## 2024-02-09 RX ORDER — ACETAMINOPHEN 650 MG/1
650 TABLET, EXTENDED RELEASE ORAL
Qty: 120 | Refills: 0 | Status: ACTIVE | COMMUNITY
Start: 2024-02-09 | End: 1900-01-01

## 2024-02-14 ENCOUNTER — APPOINTMENT (OUTPATIENT)
Dept: ORTHOPEDIC SURGERY | Facility: CLINIC | Age: 64
End: 2024-02-14
Payer: MEDICARE

## 2024-02-14 VITALS — WEIGHT: 180 LBS | HEIGHT: 71 IN | BODY MASS INDEX: 25.2 KG/M2

## 2024-02-14 PROCEDURE — 99024 POSTOP FOLLOW-UP VISIT: CPT

## 2024-02-14 PROCEDURE — 73502 X-RAY EXAM HIP UNI 2-3 VIEWS: CPT

## 2024-02-14 NOTE — HISTORY OF PRESENT ILLNESS
[Rest] : rest [Meds] : meds [] : Post Surgical Visit: yes [FreeTextEntry5] : 62 y/o M presents for PO #1 eval of the Rt. hip today. s/p Rt. THR on DOS noted above. Pt reports recovery is going well with some pain persisting. Attended first PT session this past Monday. Tylenol as needed. [de-identified] : 1/16/24 [de-identified] : Rt. THR

## 2024-02-14 NOTE — ASSESSMENT
[FreeTextEntry1] : The patient is s/p a right hip KRISTAN of 4 weeks on 1/16/24. The patient denies fever, chills, CP, SOB. The patient denies drainage or discharge from their incision. The patient is doing well postoperatively. The patient denies new trauma. He denies paresthesias. The patient is using a walker full time. He has started outpatient PT and doing well. He can walk further each day. The patient is using tylenol as needed for pain. He reports occasional groin soreness and anterior thigh soreness.  Right hip exam: The patient presents with no apparent distress. Neurovascularly intact. Sensation intact to right lower extremity. Operative incision is clean, dry, and intact. No active drainage or discharge.  straight leg raise. Tender to palpation to lateral hip. Patient is ambulating with soreness. Leg lengths are equal.  Right hip xrays taken today in office, 1 view AP pelvis and two views hip, NWB: Right KRISTAN hardware intact without loosening. No fractures or loose bodies. No obvious tumors, masses, or lesions.  The patient is doing well. He will continue with PT and OTC pain control. He will return in 8 weeks with Dr. Maya.

## 2024-02-21 ENCOUNTER — APPOINTMENT (OUTPATIENT)
Dept: ORTHOPEDIC SURGERY | Facility: CLINIC | Age: 64
End: 2024-02-21

## 2024-02-22 ENCOUNTER — TRANSCRIPTION ENCOUNTER (OUTPATIENT)
Age: 64
End: 2024-02-22

## 2024-03-22 ENCOUNTER — TRANSCRIPTION ENCOUNTER (OUTPATIENT)
Age: 64
End: 2024-03-22

## 2024-04-09 ENCOUNTER — TRANSCRIPTION ENCOUNTER (OUTPATIENT)
Age: 64
End: 2024-04-09

## 2024-04-10 ENCOUNTER — APPOINTMENT (OUTPATIENT)
Dept: ORTHOPEDIC SURGERY | Facility: CLINIC | Age: 64
End: 2024-04-10
Payer: MEDICARE

## 2024-04-10 VITALS — WEIGHT: 180 LBS | HEIGHT: 71 IN | BODY MASS INDEX: 25.2 KG/M2

## 2024-04-10 DIAGNOSIS — M87.051 IDIOPATHIC ASEPTIC NECROSIS OF RIGHT FEMUR: ICD-10-CM

## 2024-04-10 DIAGNOSIS — M16.11 UNILATERAL PRIMARY OSTEOARTHRITIS, RIGHT HIP: ICD-10-CM

## 2024-04-10 PROCEDURE — 99024 POSTOP FOLLOW-UP VISIT: CPT

## 2024-04-11 PROBLEM — M16.11 PRIMARY OSTEOARTHRITIS OF RIGHT HIP: Status: ACTIVE | Noted: 2023-11-26

## 2024-04-11 PROBLEM — M87.051 AVASCULAR NECROSIS OF BONE OF RIGHT HIP: Status: ACTIVE | Noted: 2023-11-26

## 2024-04-11 NOTE — ASSESSMENT
[FreeTextEntry1] : The patient comes in today for follow-up on his right hip he is now 3 months out from his right total hip replacement on January 16, 2024.  Overall he is doing well.  Continue physical therapy to improve his gait and his range of motion.  He has minimal pain.  Examination right lower extremity reveals normal neurovascular exam.  His leg lengths are equal.  His scar is well-healed.  There is no signs of infection or DVT.  He walks without a limp.  Plan at this time is continue physical therapy and I will see him back in the office in 3 months as necessary.

## 2024-04-11 NOTE — HISTORY OF PRESENT ILLNESS
[3] : 3 [Localized] : localized [Rest] : rest [Meds] : meds [] : no [FreeTextEntry5] : 62 y/o M presents for PO #2 eval of the Rt. hip today. s/p Rt. THR on DOS noted above. Pt reports improvement with minimal reported pain.  [de-identified] : 1/16/24 [de-identified] : Rt. THR